# Patient Record
Sex: MALE | Race: WHITE | NOT HISPANIC OR LATINO | Employment: OTHER | URBAN - METROPOLITAN AREA
[De-identification: names, ages, dates, MRNs, and addresses within clinical notes are randomized per-mention and may not be internally consistent; named-entity substitution may affect disease eponyms.]

---

## 2024-10-01 ENCOUNTER — APPOINTMENT (OUTPATIENT)
Dept: CARDIOLOGY | Facility: HOSPITAL | Age: 74
End: 2024-10-01
Payer: MEDICARE

## 2024-10-01 ENCOUNTER — APPOINTMENT (OUTPATIENT)
Dept: RADIOLOGY | Facility: HOSPITAL | Age: 74
End: 2024-10-01
Payer: MEDICARE

## 2024-10-01 ENCOUNTER — HOSPITAL ENCOUNTER (INPATIENT)
Facility: HOSPITAL | Age: 74
LOS: 3 days | Discharge: HOME | End: 2024-10-04
Attending: STUDENT IN AN ORGANIZED HEALTH CARE EDUCATION/TRAINING PROGRAM | Admitting: INTERNAL MEDICINE
Payer: MEDICARE

## 2024-10-01 DIAGNOSIS — S72.115A CLOSED NONDISPLACED FRACTURE OF GREATER TROCHANTER OF LEFT FEMUR, INITIAL ENCOUNTER: Primary | ICD-10-CM

## 2024-10-01 PROBLEM — F41.9 ANXIETY DISORDER: Status: ACTIVE | Noted: 2024-10-01

## 2024-10-01 PROBLEM — H52.203 ASTIGMATISM OF BOTH EYES: Status: ACTIVE | Noted: 2024-01-16

## 2024-10-01 PROBLEM — E78.5 HYPERLIPIDEMIA: Status: ACTIVE | Noted: 2024-10-01

## 2024-10-01 PROBLEM — S72.112A CLOSED FRACTURE OF GREATER TROCHANTER OF LEFT FEMUR: Status: ACTIVE | Noted: 2024-10-01

## 2024-10-01 PROBLEM — H43.813 PVD (POSTERIOR VITREOUS DETACHMENT), BOTH EYES: Status: ACTIVE | Noted: 2024-01-16

## 2024-10-01 LAB
ABO GROUP (TYPE) IN BLOOD: NORMAL
ALBUMIN SERPL BCP-MCNC: 4.2 G/DL (ref 3.4–5)
ALP SERPL-CCNC: 54 U/L (ref 33–136)
ALT SERPL W P-5'-P-CCNC: 17 U/L (ref 10–52)
ANION GAP SERPL CALC-SCNC: 11 MMOL/L (ref 10–20)
ANTIBODY SCREEN: NORMAL
APTT PPP: 27 SECONDS (ref 27–38)
AST SERPL W P-5'-P-CCNC: 20 U/L (ref 9–39)
BASOPHILS # BLD AUTO: 0.07 X10*3/UL (ref 0–0.1)
BASOPHILS NFR BLD AUTO: 0.8 %
BILIRUB SERPL-MCNC: 1.1 MG/DL (ref 0–1.2)
BUN SERPL-MCNC: 10 MG/DL (ref 6–23)
CALCIUM SERPL-MCNC: 8.8 MG/DL (ref 8.6–10.3)
CHLORIDE SERPL-SCNC: 96 MMOL/L (ref 98–107)
CO2 SERPL-SCNC: 31 MMOL/L (ref 21–32)
CREAT SERPL-MCNC: 0.69 MG/DL (ref 0.5–1.3)
EGFRCR SERPLBLD CKD-EPI 2021: >90 ML/MIN/1.73M*2
EOSINOPHIL # BLD AUTO: 0 X10*3/UL (ref 0–0.4)
EOSINOPHIL NFR BLD AUTO: 0 %
ERYTHROCYTE [DISTWIDTH] IN BLOOD BY AUTOMATED COUNT: 12.6 % (ref 11.5–14.5)
GLUCOSE SERPL-MCNC: 116 MG/DL (ref 74–99)
HCT VFR BLD AUTO: 40.6 % (ref 41–52)
HGB BLD-MCNC: 13.3 G/DL (ref 13.5–17.5)
IMM GRANULOCYTES # BLD AUTO: 0.04 X10*3/UL (ref 0–0.5)
IMM GRANULOCYTES NFR BLD AUTO: 0.4 % (ref 0–0.9)
INR PPP: 1.1 (ref 0.9–1.1)
LYMPHOCYTES # BLD AUTO: 1.54 X10*3/UL (ref 0.8–3)
LYMPHOCYTES NFR BLD AUTO: 16.9 %
MCH RBC QN AUTO: 31.9 PG (ref 26–34)
MCHC RBC AUTO-ENTMCNC: 32.8 G/DL (ref 32–36)
MCV RBC AUTO: 97 FL (ref 80–100)
MONOCYTES # BLD AUTO: 1.08 X10*3/UL (ref 0.05–0.8)
MONOCYTES NFR BLD AUTO: 11.9 %
NEUTROPHILS # BLD AUTO: 6.38 X10*3/UL (ref 1.6–5.5)
NEUTROPHILS NFR BLD AUTO: 70 %
NRBC BLD-RTO: 0 /100 WBCS (ref 0–0)
PLATELET # BLD AUTO: 191 X10*3/UL (ref 150–450)
POTASSIUM SERPL-SCNC: 4 MMOL/L (ref 3.5–5.3)
PROT SERPL-MCNC: 6.9 G/DL (ref 6.4–8.2)
PROTHROMBIN TIME: 12.5 SECONDS (ref 9.8–12.8)
RBC # BLD AUTO: 4.17 X10*6/UL (ref 4.5–5.9)
RH FACTOR (ANTIGEN D): NORMAL
SODIUM SERPL-SCNC: 134 MMOL/L (ref 136–145)
WBC # BLD AUTO: 9.1 X10*3/UL (ref 4.4–11.3)

## 2024-10-01 PROCEDURE — 73502 X-RAY EXAM HIP UNI 2-3 VIEWS: CPT | Mod: LEFT SIDE | Performed by: RADIOLOGY

## 2024-10-01 PROCEDURE — 2500000004 HC RX 250 GENERAL PHARMACY W/ HCPCS (ALT 636 FOR OP/ED): Performed by: PHYSICIAN ASSISTANT

## 2024-10-01 PROCEDURE — 86900 BLOOD TYPING SEROLOGIC ABO: CPT | Performed by: PHYSICIAN ASSISTANT

## 2024-10-01 PROCEDURE — 99223 1ST HOSP IP/OBS HIGH 75: CPT | Performed by: PHYSICIAN ASSISTANT

## 2024-10-01 PROCEDURE — 2500000001 HC RX 250 WO HCPCS SELF ADMINISTERED DRUGS (ALT 637 FOR MEDICARE OP): Performed by: PHYSICIAN ASSISTANT

## 2024-10-01 PROCEDURE — 85730 THROMBOPLASTIN TIME PARTIAL: CPT | Performed by: PHYSICIAN ASSISTANT

## 2024-10-01 PROCEDURE — 1210000001 HC SEMI-PRIVATE ROOM DAILY

## 2024-10-01 PROCEDURE — 93005 ELECTROCARDIOGRAM TRACING: CPT

## 2024-10-01 PROCEDURE — 73700 CT LOWER EXTREMITY W/O DYE: CPT | Mod: FOREIGN READ | Performed by: RADIOLOGY

## 2024-10-01 PROCEDURE — 36415 COLL VENOUS BLD VENIPUNCTURE: CPT | Performed by: PHYSICIAN ASSISTANT

## 2024-10-01 PROCEDURE — 72192 CT PELVIS W/O DYE: CPT

## 2024-10-01 PROCEDURE — 99222 1ST HOSP IP/OBS MODERATE 55: CPT | Performed by: ORTHOPAEDIC SURGERY

## 2024-10-01 PROCEDURE — 73700 CT LOWER EXTREMITY W/O DYE: CPT | Mod: LT

## 2024-10-01 PROCEDURE — 2500000002 HC RX 250 W HCPCS SELF ADMINISTERED DRUGS (ALT 637 FOR MEDICARE OP, ALT 636 FOR OP/ED): Performed by: PHYSICIAN ASSISTANT

## 2024-10-01 PROCEDURE — 73502 X-RAY EXAM HIP UNI 2-3 VIEWS: CPT | Mod: LT

## 2024-10-01 PROCEDURE — 71045 X-RAY EXAM CHEST 1 VIEW: CPT

## 2024-10-01 PROCEDURE — 72192 CT PELVIS W/O DYE: CPT | Mod: FOREIGN READ | Performed by: RADIOLOGY

## 2024-10-01 PROCEDURE — 85025 COMPLETE CBC W/AUTO DIFF WBC: CPT | Performed by: PHYSICIAN ASSISTANT

## 2024-10-01 PROCEDURE — 85610 PROTHROMBIN TIME: CPT | Performed by: PHYSICIAN ASSISTANT

## 2024-10-01 PROCEDURE — 86901 BLOOD TYPING SEROLOGIC RH(D): CPT | Performed by: PHYSICIAN ASSISTANT

## 2024-10-01 PROCEDURE — 80053 COMPREHEN METABOLIC PANEL: CPT | Performed by: PHYSICIAN ASSISTANT

## 2024-10-01 PROCEDURE — 99285 EMERGENCY DEPT VISIT HI MDM: CPT

## 2024-10-01 RX ORDER — ATORVASTATIN CALCIUM 10 MG/1
1 TABLET, FILM COATED ORAL NIGHTLY
COMMUNITY
Start: 2024-10-06

## 2024-10-01 RX ORDER — SENNOSIDES 8.6 MG/1
2 TABLET ORAL 2 TIMES DAILY
Status: DISCONTINUED | OUTPATIENT
Start: 2024-10-01 | End: 2024-10-03

## 2024-10-01 RX ORDER — FINASTERIDE 5 MG/1
5 TABLET, FILM COATED ORAL DAILY
COMMUNITY
End: 2024-10-01 | Stop reason: ENTERED-IN-ERROR

## 2024-10-01 RX ORDER — ACETAMINOPHEN 650 MG/1
650 SUPPOSITORY RECTAL EVERY 4 HOURS PRN
Status: DISCONTINUED | OUTPATIENT
Start: 2024-10-01 | End: 2024-10-03

## 2024-10-01 RX ORDER — TAMSULOSIN HYDROCHLORIDE 0.4 MG/1
1 CAPSULE ORAL NIGHTLY
COMMUNITY
Start: 2024-10-06

## 2024-10-01 RX ORDER — ESCITALOPRAM OXALATE 10 MG/1
10 TABLET ORAL DAILY
COMMUNITY
Start: 2012-08-15 | End: 2024-10-01 | Stop reason: ENTERED-IN-ERROR

## 2024-10-01 RX ORDER — OXYCODONE HYDROCHLORIDE 5 MG/1
5 TABLET ORAL EVERY 4 HOURS PRN
Status: DISCONTINUED | OUTPATIENT
Start: 2024-10-01 | End: 2024-10-03

## 2024-10-01 RX ORDER — ONDANSETRON 4 MG/1
4 TABLET, ORALLY DISINTEGRATING ORAL EVERY 8 HOURS PRN
Status: DISCONTINUED | OUTPATIENT
Start: 2024-10-01 | End: 2024-10-03

## 2024-10-01 RX ORDER — ACETAMINOPHEN 160 MG/5ML
650 SOLUTION ORAL EVERY 4 HOURS PRN
Status: DISCONTINUED | OUTPATIENT
Start: 2024-10-01 | End: 2024-10-03

## 2024-10-01 RX ORDER — MORPHINE SULFATE 2 MG/ML
2 INJECTION, SOLUTION INTRAMUSCULAR; INTRAVENOUS ONCE
Status: COMPLETED | OUTPATIENT
Start: 2024-10-01 | End: 2024-10-01

## 2024-10-01 RX ORDER — ATORVASTATIN CALCIUM 10 MG/1
10 TABLET, FILM COATED ORAL DAILY
Status: DISCONTINUED | OUTPATIENT
Start: 2024-10-02 | End: 2024-10-04 | Stop reason: HOSPADM

## 2024-10-01 RX ORDER — ENOXAPARIN SODIUM 100 MG/ML
40 INJECTION SUBCUTANEOUS EVERY 24 HOURS
Status: DISCONTINUED | OUTPATIENT
Start: 2024-10-02 | End: 2024-10-03

## 2024-10-01 RX ORDER — ONDANSETRON HYDROCHLORIDE 2 MG/ML
4 INJECTION, SOLUTION INTRAVENOUS EVERY 8 HOURS PRN
Status: DISCONTINUED | OUTPATIENT
Start: 2024-10-01 | End: 2024-10-03

## 2024-10-01 RX ORDER — ESCITALOPRAM OXALATE 5 MG/1
1 TABLET ORAL DAILY
COMMUNITY
Start: 2024-10-06

## 2024-10-01 RX ORDER — HYDROCODONE BITARTRATE AND ACETAMINOPHEN 5; 325 MG/1; MG/1
1 TABLET ORAL ONCE
Status: COMPLETED | OUTPATIENT
Start: 2024-10-01 | End: 2024-10-01

## 2024-10-01 RX ORDER — ATORVASTATIN CALCIUM 40 MG/1
40 TABLET, FILM COATED ORAL NIGHTLY
COMMUNITY
End: 2024-10-01 | Stop reason: ENTERED-IN-ERROR

## 2024-10-01 RX ORDER — TAMSULOSIN HYDROCHLORIDE 0.4 MG/1
0.4 CAPSULE ORAL DAILY
COMMUNITY
Start: 2013-04-16 | End: 2024-10-01 | Stop reason: ENTERED-IN-ERROR

## 2024-10-01 RX ORDER — TAMSULOSIN HYDROCHLORIDE 0.4 MG/1
0.4 CAPSULE ORAL NIGHTLY
Status: DISCONTINUED | OUTPATIENT
Start: 2024-10-01 | End: 2024-10-04 | Stop reason: HOSPADM

## 2024-10-01 RX ORDER — ESCITALOPRAM OXALATE 10 MG/1
5 TABLET ORAL DAILY
Status: DISCONTINUED | OUTPATIENT
Start: 2024-10-02 | End: 2024-10-04 | Stop reason: HOSPADM

## 2024-10-01 RX ORDER — ACETAMINOPHEN 325 MG/1
650 TABLET ORAL EVERY 4 HOURS PRN
Status: DISCONTINUED | OUTPATIENT
Start: 2024-10-01 | End: 2024-10-03

## 2024-10-01 RX ORDER — TRAMADOL HYDROCHLORIDE 50 MG/1
50 TABLET ORAL EVERY 8 HOURS PRN
Status: DISCONTINUED | OUTPATIENT
Start: 2024-10-01 | End: 2024-10-03

## 2024-10-01 SDOH — SOCIAL STABILITY: SOCIAL INSECURITY: ARE THERE ANY APPARENT SIGNS OF INJURIES/BEHAVIORS THAT COULD BE RELATED TO ABUSE/NEGLECT?: NO

## 2024-10-01 SDOH — ECONOMIC STABILITY: INCOME INSECURITY: HOW HARD IS IT FOR YOU TO PAY FOR THE VERY BASICS LIKE FOOD, HOUSING, MEDICAL CARE, AND HEATING?: NOT HARD AT ALL

## 2024-10-01 SDOH — SOCIAL STABILITY: SOCIAL INSECURITY: WITHIN THE LAST YEAR, HAVE YOU BEEN HUMILIATED OR EMOTIONALLY ABUSED IN OTHER WAYS BY YOUR PARTNER OR EX-PARTNER?: NO

## 2024-10-01 SDOH — ECONOMIC STABILITY: INCOME INSECURITY: IN THE PAST 12 MONTHS, HAS THE ELECTRIC, GAS, OIL, OR WATER COMPANY THREATENED TO SHUT OFF SERVICE IN YOUR HOME?: NO

## 2024-10-01 SDOH — SOCIAL STABILITY: SOCIAL INSECURITY: DOES ANYONE TRY TO KEEP YOU FROM HAVING/CONTACTING OTHER FRIENDS OR DOING THINGS OUTSIDE YOUR HOME?: NO

## 2024-10-01 SDOH — SOCIAL STABILITY: SOCIAL INSECURITY: ARE YOU OR HAVE YOU BEEN THREATENED OR ABUSED PHYSICALLY, EMOTIONALLY, OR SEXUALLY BY ANYONE?: NO

## 2024-10-01 SDOH — SOCIAL STABILITY: SOCIAL INSECURITY: DO YOU FEEL ANYONE HAS EXPLOITED OR TAKEN ADVANTAGE OF YOU FINANCIALLY OR OF YOUR PERSONAL PROPERTY?: NO

## 2024-10-01 SDOH — SOCIAL STABILITY: SOCIAL INSECURITY: DO YOU FEEL UNSAFE GOING BACK TO THE PLACE WHERE YOU ARE LIVING?: NO

## 2024-10-01 SDOH — SOCIAL STABILITY: SOCIAL INSECURITY: HAVE YOU HAD ANY THOUGHTS OF HARMING ANYONE ELSE?: NO

## 2024-10-01 SDOH — ECONOMIC STABILITY: FOOD INSECURITY: WITHIN THE PAST 12 MONTHS, THE FOOD YOU BOUGHT JUST DIDN'T LAST AND YOU DIDN'T HAVE MONEY TO GET MORE.: NEVER TRUE

## 2024-10-01 SDOH — SOCIAL STABILITY: SOCIAL INSECURITY: HAVE YOU HAD THOUGHTS OF HARMING ANYONE ELSE?: NO

## 2024-10-01 SDOH — SOCIAL STABILITY: SOCIAL INSECURITY
WITHIN THE LAST YEAR, HAVE TO BEEN RAPED OR FORCED TO HAVE ANY KIND OF SEXUAL ACTIVITY BY YOUR PARTNER OR EX-PARTNER?: NO

## 2024-10-01 SDOH — SOCIAL STABILITY: SOCIAL INSECURITY
WITHIN THE LAST YEAR, HAVE YOU BEEN KICKED, HIT, SLAPPED, OR OTHERWISE PHYSICALLY HURT BY YOUR PARTNER OR EX-PARTNER?: NO

## 2024-10-01 SDOH — HEALTH STABILITY: PHYSICAL HEALTH: ON AVERAGE, HOW MANY MINUTES DO YOU ENGAGE IN EXERCISE AT THIS LEVEL?: 30 MIN

## 2024-10-01 SDOH — SOCIAL STABILITY: SOCIAL INSECURITY: WITHIN THE LAST YEAR, HAVE YOU BEEN AFRAID OF YOUR PARTNER OR EX-PARTNER?: NO

## 2024-10-01 SDOH — SOCIAL STABILITY: SOCIAL INSECURITY: ABUSE: ADULT

## 2024-10-01 SDOH — ECONOMIC STABILITY: FOOD INSECURITY: WITHIN THE PAST 12 MONTHS, YOU WORRIED THAT YOUR FOOD WOULD RUN OUT BEFORE YOU GOT MONEY TO BUY MORE.: NEVER TRUE

## 2024-10-01 SDOH — ECONOMIC STABILITY: TRANSPORTATION INSECURITY
IN THE PAST 12 MONTHS, HAS LACK OF TRANSPORTATION KEPT YOU FROM MEETINGS, WORK, OR FROM GETTING THINGS NEEDED FOR DAILY LIVING?: NO

## 2024-10-01 SDOH — SOCIAL STABILITY: SOCIAL INSECURITY: WERE YOU ABLE TO COMPLETE ALL THE BEHAVIORAL HEALTH SCREENINGS?: YES

## 2024-10-01 SDOH — ECONOMIC STABILITY: INCOME INSECURITY: IN THE LAST 12 MONTHS, WAS THERE A TIME WHEN YOU WERE NOT ABLE TO PAY THE MORTGAGE OR RENT ON TIME?: NO

## 2024-10-01 SDOH — SOCIAL STABILITY: SOCIAL INSECURITY: HAS ANYONE EVER THREATENED TO HURT YOUR FAMILY OR YOUR PETS?: NO

## 2024-10-01 SDOH — ECONOMIC STABILITY: TRANSPORTATION INSECURITY
IN THE PAST 12 MONTHS, HAS THE LACK OF TRANSPORTATION KEPT YOU FROM MEDICAL APPOINTMENTS OR FROM GETTING MEDICATIONS?: NO

## 2024-10-01 SDOH — HEALTH STABILITY: PHYSICAL HEALTH: ON AVERAGE, HOW MANY DAYS PER WEEK DO YOU ENGAGE IN MODERATE TO STRENUOUS EXERCISE (LIKE A BRISK WALK)?: 3 DAYS

## 2024-10-01 ASSESSMENT — COGNITIVE AND FUNCTIONAL STATUS - GENERAL
DAILY ACTIVITIY SCORE: 24
DAILY ACTIVITIY SCORE: 24
MOBILITY SCORE: 24
MOBILITY SCORE: 24
PATIENT BASELINE BEDBOUND: NO

## 2024-10-01 ASSESSMENT — ACTIVITIES OF DAILY LIVING (ADL)
JUDGMENT_ADEQUATE_SAFELY_COMPLETE_DAILY_ACTIVITIES: YES
WALKS IN HOME: INDEPENDENT
EFFECT OF PAIN ON DAILY ACTIVITIES: DECREASED
HEARING - RIGHT EAR: FUNCTIONAL
FEEDING YOURSELF: INDEPENDENT
GROOMING: INDEPENDENT
TOILETING: INDEPENDENT
HEARING - LEFT EAR: FUNCTIONAL
DRESSING YOURSELF: INDEPENDENT
BATHING: INDEPENDENT
ADEQUATE_TO_COMPLETE_ADL: YES
PATIENT'S MEMORY ADEQUATE TO SAFELY COMPLETE DAILY ACTIVITIES?: YES

## 2024-10-01 ASSESSMENT — PAIN DESCRIPTION - ORIENTATION
ORIENTATION: LEFT
ORIENTATION: LEFT

## 2024-10-01 ASSESSMENT — COLUMBIA-SUICIDE SEVERITY RATING SCALE - C-SSRS
6. HAVE YOU EVER DONE ANYTHING, STARTED TO DO ANYTHING, OR PREPARED TO DO ANYTHING TO END YOUR LIFE?: NO
2. HAVE YOU ACTUALLY HAD ANY THOUGHTS OF KILLING YOURSELF?: NO
1. IN THE PAST MONTH, HAVE YOU WISHED YOU WERE DEAD OR WISHED YOU COULD GO TO SLEEP AND NOT WAKE UP?: NO

## 2024-10-01 ASSESSMENT — PATIENT HEALTH QUESTIONNAIRE - PHQ9
1. LITTLE INTEREST OR PLEASURE IN DOING THINGS: NOT AT ALL
1. LITTLE INTEREST OR PLEASURE IN DOING THINGS: NOT AT ALL
2. FEELING DOWN, DEPRESSED OR HOPELESS: NOT AT ALL
2. FEELING DOWN, DEPRESSED OR HOPELESS: NOT AT ALL
SUM OF ALL RESPONSES TO PHQ9 QUESTIONS 1 & 2: 0
SUM OF ALL RESPONSES TO PHQ9 QUESTIONS 1 & 2: 0

## 2024-10-01 ASSESSMENT — LIFESTYLE VARIABLES
AUDIT-C TOTAL SCORE: 0
SKIP TO QUESTIONS 9-10: 1
SKIP TO QUESTIONS 9-10: 1
HOW MANY STANDARD DRINKS CONTAINING ALCOHOL DO YOU HAVE ON A TYPICAL DAY: PATIENT DOES NOT DRINK
PRESCIPTION_ABUSE_PAST_12_MONTHS: NO
SUBSTANCE_ABUSE_PAST_12_MONTHS: NO
HOW OFTEN DO YOU HAVE 6 OR MORE DRINKS ON ONE OCCASION: NEVER
HOW OFTEN DO YOU HAVE A DRINK CONTAINING ALCOHOL: NEVER
AUDIT-C TOTAL SCORE: 0
AUDIT-C TOTAL SCORE: 0
HOW MANY STANDARD DRINKS CONTAINING ALCOHOL DO YOU HAVE ON A TYPICAL DAY: PATIENT DOES NOT DRINK
HOW OFTEN DO YOU HAVE 6 OR MORE DRINKS ON ONE OCCASION: NEVER
HOW OFTEN DO YOU HAVE A DRINK CONTAINING ALCOHOL: NEVER
AUDIT-C TOTAL SCORE: 0

## 2024-10-01 ASSESSMENT — PAIN DESCRIPTION - DESCRIPTORS
DESCRIPTORS: ACHING
DESCRIPTORS: ACHING;TENDER;SHARP
DESCRIPTORS: ACHING;TENDER

## 2024-10-01 ASSESSMENT — PAIN SCALES - GENERAL
PAINLEVEL_OUTOF10: 5 - MODERATE PAIN
PAINLEVEL_OUTOF10: 9
PAINLEVEL_OUTOF10: 3
PAINLEVEL_OUTOF10: 2
PAINLEVEL_OUTOF10: 9

## 2024-10-01 ASSESSMENT — PAIN DESCRIPTION - LOCATION
LOCATION: HIP
LOCATION: HIP

## 2024-10-01 ASSESSMENT — PAIN DESCRIPTION - ONSET
ONSET: SUDDEN
ONSET: SUDDEN

## 2024-10-01 ASSESSMENT — PAIN - FUNCTIONAL ASSESSMENT
PAIN_FUNCTIONAL_ASSESSMENT: 0-10

## 2024-10-01 ASSESSMENT — PAIN DESCRIPTION - PAIN TYPE
TYPE: ACUTE PAIN
TYPE: ACUTE PAIN

## 2024-10-01 ASSESSMENT — PAIN DESCRIPTION - FREQUENCY
FREQUENCY: INTERMITTENT
FREQUENCY: INTERMITTENT

## 2024-10-01 ASSESSMENT — PAIN DESCRIPTION - PROGRESSION
CLINICAL_PROGRESSION: GRADUALLY WORSENING
CLINICAL_PROGRESSION: NOT CHANGED

## 2024-10-01 NOTE — ED TRIAGE NOTES
Patient presents to ED from home for fall a slip and fall on wet tile in the bathroom on Sunday. Patient complains if left hip pain. Denies CP, SOB, or dizziness.

## 2024-10-01 NOTE — PROGRESS NOTES
Pharmacy Medication History Review   Spoke to the patient, changed the dose of a couple meds    James Viera is a 74 y.o. male admitted for Closed fracture of greater trochanter of left femur. Pharmacy reviewed the patient's [id number] medications and allergies for accuracy.    The list below reflectives the updated PTA list. Please review each medication in order reconciliation for additional clarification and justification.   The following updates were made to the Prior to Admission medication list:     Source of Information:     Medications ADDED:     Medications CHANGED:  Lipitor is 10 mg  Lexapro is 5 mg  Medications REMOVED:   Proscar  Medications NOT TAKING:       Allergy reviewed : Yes    Comments:     Prior to Admission Medications   Prescriptions Last Dose Informant   atorvastatin (Lipitor) 10 mg tablet 9/30/2024 at pm    Sig: Take 1 tablet (10 mg) by mouth once daily at bedtime.   escitalopram (Lexapro) 5 mg tablet 10/1/2024    Sig: Take 1 tablet (5 mg) by mouth once daily.   tamsulosin (Flomax) 0.4 mg 24 hr capsule 9/30/2024 at pm    Sig: Take 1 capsule (0.4 mg) by mouth once daily at bedtime.      Facility-Administered Medications: None       The list below reflectives the updated allergy list. Please review each documented allergy for additional clarification and justification.  Allergies  Reviewed by Eloina aCno on 10/1/2024        Severity Reactions Comments    Sulfa (sulfonamide Antibiotics) Low Rash             Below are additional concerns with the patient's PTA list.      Eloina Cano

## 2024-10-01 NOTE — H&P
History Of Present Illness  James Viera is a 74 y.o. male presenting with left femur fracture.  Comminuted left greater trochanter and left superolateral femoral neck fractures.  Patient has a history of hyperlipidemia and anxiety and BPH.  He had a mechanical fall 2 days ago.  He was out of town and slipped on a wet tile bathroom floor landing on his left hip.  He was able to get up and he has been able to walk but he has significant pain with weightbearing and moving and walking.  Developed slight nasal congestion and cough yesterday negative COVID test yesterday.  No other symptoms, no pain at rest.    Past medical history: Hyperlipidemia, anxiety, BPH    Medications: Atorvastatin 10 mg, Lexapro 5 mg, Flomax 0.4 mg    Allergies: Sulfa    Social history: Drinks alcohol occasionally, denies tobacco use    ED course:.  X-rays of the left hip with pelvis show the left femoral neck fracture.  CT of the hip shows a acute comminuted fracture of the left greater trochanter and left superolateral femoral neck as well as degenerative changes.  Orthopedics was consulted and they are likely to take the patient to the OR tomorrow per Dr. Dykes.  Patient is admitted to medicine service.     Past Medical History:   Diagnosis Date    Cough, unspecified 04/16/2013    Cough    Encounter for immunization 10/21/2014    Need for prophylactic vaccination and inoculation against influenza    Other conditions influencing health status 08/20/2013    Attention-deficit Hyperactivity Disorder    Personal history of other mental and behavioral disorders 06/17/2013    History of anxiety disorder     Past Surgical History:   Procedure Laterality Date    TONSILLECTOMY  05/11/2015    Tonsillectomy           Family History  No family history on file.     Allergies  Sulfa (sulfonamide antibiotics)    Review of Systems  Patient denies chest pain, shortness of breath, nausea, vomiting, fever, chills, diarrhea, constipation,  vision changes,  rashes, dysuria, paresthesias, vertigo, headache,  or any other complaints at this time. A complete review of systems was done, and is as stated in the history of present illness, is otherwise negative or not pertinent to the complaint.    Physical Exam  Physical exam: Vital signs and nurses notes were reviewed.    General:  no acute distress. Alert and oriented  x 3.     Head: atraumatic and normocephalic    Eyes: Pupils equal round reactive to light, EOMs are intact, conjunctivae is not injected.    Oropharynx: , buccal mucosa is moist.    Ears:  normal external exam, no swelling or erythema,    Nasal: normal external exam,     Neck: Supple, full range of motion,     Cardiac: Regular rate and rhythm. No murmurs noted.     Pulmonary: Lungs clear bilaterally with good aeration. No adventitious breath sounds. No wheezes rales or rhonchi. No accessory muscle use no retraction noted.    Abdomen: Soft,  Nontender. No guarding, rigidity, or distention. Normoactive bowel sounds. No pulsatile masses, no bruits.     Extremities: Examination of the left hip revealed no significant ecchymosis or erythema or soft tissue swelling.  He is very mild tenderness to palpation to the lateral left hip.  Does have decreased range of motion secondary to increased pain with moving the left lower extremity.  Other extremities have full range of motion.  There is no pitting edema.  Strong dorsal and plantarflexion of the feet against resistance bilaterally.  The leg does not appear shortened or rotated.    Skin: No rash seen. Skin is warm and dry     Neuro: Patient is alert and oriented x3. Speech is clear. There is no asymmetry with facial grimaces, and no tongue deviation. Patient moves all extremities independently. Sensation is intact. No obvious neuro deficits are noted.     Last Recorded Vitals  /80 (BP Location: Left arm, Patient Position: Sitting)   Pulse 76   Temp 37.2 °C (99 °F) (Temporal)   Resp 18   Wt 68 kg (150 lb)    SpO2 95%     Relevant Results  Scheduled medications  [START ON 10/2/2024] atorvastatin, 10 mg, oral, Daily  [START ON 10/2/2024] enoxaparin, 40 mg, subcutaneous, q24h  [START ON 10/2/2024] escitalopram, 5 mg, oral, Daily  sennosides, 2 tablet, oral, BID  tamsulosin, 0.4 mg, oral, Nightly      Continuous medications     PRN medications  PRN medications: acetaminophen **OR** acetaminophen **OR** acetaminophen, ondansetron ODT **OR** ondansetron, oxyCODONE, traMADol    No results found for this or any previous visit (from the past 24 hour(s)).  CT hip left wo IV contrast   Final Result   Acute comminuted fracture of the left greater trochanter and left   superolateral femoral neck.   Degenerative change of both hip joints and lower lumbar spine.   Signed by Ray Prescott MD      CT pelvis wo IV contrast   Final Result   Acute comminuted fracture of the left greater trochanter and left   superolateral femoral neck.   Degenerative change of both hip joints and lower lumbar spine.   Signed by Ray Prescott MD      XR hip left with pelvis when performed 2 or 3 views    (Results Pending)          Assessment/Plan   Assessment & Plan  Closed fracture of greater trochanter of left femur    Closed nondisplaced fracture of greater trochanter of left femur, initial encounter      Plan: Orthopedic consult in process  N.p.o. at midnight in anticipation of surgery tomorrow  Regular diet currently  Lovenox after surgery  SCDs  Continue the home meds including atorvastatin, Lexapro, Flomax  Tylenol, Zofran as needed  Senokot daily  Tramadol and oxycodone as needed pain  EKG pending  INR, CBC, CMP, type and screen pending  Daily CBC and BMP ordered  PT, OT, social work after surgery  Findings, orders, plan discussed with Dr. Cassia Caballero PA-C

## 2024-10-01 NOTE — ED NOTES
Pt transported to [facility] by [facility] on ED stretcher with safety/fall precautions, mask on, belongings, paperwork. No significant detrimental changes prior to transfer. Neuro/skin/respiratory grossly WDL.      Ag Curtis RN  10/01/24 1471

## 2024-10-01 NOTE — CONSULTS
Orthopaedic Surgery Consult H&P    HPI:   Orthopaedic Problems/Injuries: Left GT fx w Intertrochanteric extension  Other Injuries: None    74 y.o. male PMH BPH, HLD presents after slipping on wet tile in his bathroom on Sunday 9/29 sustaining above. Denies numbness, tingling, and open wounds on the affected limb.   No other areas of extremity injury.  Pain persisted in his left hip, deep seated and sharp and so he came into ED today.  No LOC.      PMH: per above/EMR  PSH: per above/EMR  SocHx:      -  Denies tobacco use      -  Denies EtOH use      -  Denies other drug use  FamHx:  Non-contributory to this patient's acute orthopaedic problem.   Allergies: Sulfa  Meds: atorvastatin, lexapro, flomax    ROS      - 14 point ROS negative except as above    Physical Exam:  Gen: AOx3, NAD  HEENT: normocephalic atraumatic  Psych: appropriate mood and affect  Resp: nonlabored breathing  Cardiac: Extremities WWP, RRR to peripheral palpation  Neuro: CN 2-12 grossly intact  Skin: no rashes, bruising left arm     Left lower extremity:  - Skin intact   - Tender to palpation over left hip  - Fires EHL/DF/PF.  - Sensation intact to light touch in sural, saphenous, superficial/deep peroneal, tibial nerve distributions.  - 2+ DP pulse, < 2 seconds capillary refill.    A full secondary exam was performed and all relevant findings discussed and noted above.    Results for orders placed or performed during the hospital encounter of 10/01/24 (from the past 24 hour(s))   CBC and Auto Differential   Result Value Ref Range    WBC 9.1 4.4 - 11.3 x10*3/uL    nRBC 0.0 0.0 - 0.0 /100 WBCs    RBC 4.17 (L) 4.50 - 5.90 x10*6/uL    Hemoglobin 13.3 (L) 13.5 - 17.5 g/dL    Hematocrit 40.6 (L) 41.0 - 52.0 %    MCV 97 80 - 100 fL    MCH 31.9 26.0 - 34.0 pg    MCHC 32.8 32.0 - 36.0 g/dL    RDW 12.6 11.5 - 14.5 %    Platelets 191 150 - 450 x10*3/uL    Neutrophils % 70.0 40.0 - 80.0 %    Immature Granulocytes %, Automated 0.4 0.0 - 0.9 %    Lymphocytes %  16.9 13.0 - 44.0 %    Monocytes % 11.9 2.0 - 10.0 %    Eosinophils % 0.0 0.0 - 6.0 %    Basophils % 0.8 0.0 - 2.0 %    Neutrophils Absolute 6.38 (H) 1.60 - 5.50 x10*3/uL    Immature Granulocytes Absolute, Automated 0.04 0.00 - 0.50 x10*3/uL    Lymphocytes Absolute 1.54 0.80 - 3.00 x10*3/uL    Monocytes Absolute 1.08 (H) 0.05 - 0.80 x10*3/uL    Eosinophils Absolute 0.00 0.00 - 0.40 x10*3/uL    Basophils Absolute 0.07 0.00 - 0.10 x10*3/uL   Comprehensive metabolic panel   Result Value Ref Range    Glucose 116 (H) 74 - 99 mg/dL    Sodium 134 (L) 136 - 145 mmol/L    Potassium 4.0 3.5 - 5.3 mmol/L    Chloride 96 (L) 98 - 107 mmol/L    Bicarbonate 31 21 - 32 mmol/L    Anion Gap 11 10 - 20 mmol/L    Urea Nitrogen 10 6 - 23 mg/dL    Creatinine 0.69 0.50 - 1.30 mg/dL    eGFR >90 >60 mL/min/1.73m*2    Calcium 8.8 8.6 - 10.3 mg/dL    Albumin 4.2 3.4 - 5.0 g/dL    Alkaline Phosphatase 54 33 - 136 U/L    Total Protein 6.9 6.4 - 8.2 g/dL    AST 20 9 - 39 U/L    Bilirubin, Total 1.1 0.0 - 1.2 mg/dL    ALT 17 10 - 52 U/L   Protime-INR   Result Value Ref Range    Protime 12.5 9.8 - 12.8 seconds    INR 1.1 0.9 - 1.1   aPTT   Result Value Ref Range    aPTT 27 27 - 38 seconds       XR chest 1 view   Final Result   Allowing for the aforementioned limitation, unremarkable chest.        Signed by: Kunal Yip 10/1/2024 5:05 PM   Dictation workstation:   [id number]      XR hip left with pelvis when performed 2 or 3 views   Final Result   The left femoral neck fracture described on the CT scan of earlier the   same day is much less conspicuous by plain radiography.   Signed by Matthew Arriola MD      CT hip left wo IV contrast   Final Result   Acute comminuted fracture of the left greater trochanter and left   superolateral femoral neck.   Degenerative change of both hip joints and lower lumbar spine.   Signed by Ray Prescott MD      CT pelvis wo IV contrast   Final Result   Acute comminuted fracture of the left greater trochanter and  left   superolateral femoral neck.   Degenerative change of both hip joints and lower lumbar spine.   Signed by Ray Prescott MD          Assessment:  Orthopaedic Problems/Injuries: Left GT fx w Intertrochanteric extension  Other Injuries: None    74 y.o. male PMH BPH, HLD presents after mGLF on Sunday 9/29 sustaining above. Isolated injury. Reports that he is very healthy and active at baseline.     Plan:  - Consented and posted for Left hip cephalomedullary nail with Dr Dykes on 10/2/24  - WB: NWB LLE   - Abx: no indication for preoperative abx  - Diet: NPO at midnight  - DVT: per primary, hold for OR  - Please obtain all preop labs (CBC,BMP,EKG, CXR, Coags, Type and Screen)  - Purdy: Recommend purdy in setting of immobilizing fx if patient is amenable    - Dispo: Pending OR tomorrow    Wilner Newberry MD  On Call Resident - PGY-3 Orthopedic Surgery  [facility]  Epic Message Preferred  Pager: [phone]    This patient was seen within 30 minutes of initial consult.    I saw and evaluated the patient.   Briefly this is a pleasant 74 year old male who fell on wet floor in bathroom and sustained an intertrochanteric equivalent fracture left hip.  I personally reviewed the patient's xray and ct images and reports showing greater trochanteric fracture with intertrochanteric extension and small area involvement superior neck  on left.  Discussed alternatives of treatment in detail, risks and benefits.  Gamma nail fixation of fracture recommended for best functional outcome and to aid healing of fracture.  Discussed risks of surgery including pain, bleeding, infection, bone healing problems, wound healing problems, medical issues, DVT and PE.  Discussed post-op course requiring foot flat weightbearing, rehab protocol, recovery time (early 3 months, full 6-12 months).  Discussed procedure details and answered patient's questions.  Determination if he goes home or to SNF after surgery will depend on how he  does with PT after surgery and home situation/layout.  I personally obtained the key and critical portions of the history and physical exam or was physically present for key and critical portions performed by the resident/fellow. I reviewed the resident/fellow's documentation and discussed the patient with the resident/fellow. I agree with the resident/fellow's medical decision making as documented in the note.    Larissa Dykes MD   _________________________________________________________    This patient will be followed by the Ortho Foot and Ankle Team while inpatient. See team members and contacts below:     While admitted, this patient will be followed by the Ortho Foot and Ankle Team.  Please contact below resident with any questions (Available via Epic Chat).    Miguelito Cuevas, PGY-3     6pm-6am M-F, Holidays, and weekends page Ortho on-call @[phone] with urgent questions/concerns.

## 2024-10-01 NOTE — ED NOTES
Report given to nurse [facility]. Transport to follow shortly.     Ag Curtis RN  10/01/24 6183

## 2024-10-01 NOTE — ED PROVIDER NOTES
HPI   Chief Complaint   Patient presents with    Fall    Hip Pain       History of present illness: 74-year-old male complains of fall and left hip injury that occurred 2 days ago.  Patient states that he has 9 out of 10 pain that is persistent and worse with ambulating.  He saw his primary care provider yesterday who ordered an x-ray.  This x-ray showed no fracture.  He was concerned because he is having persistent pain and is causing him difficulty in ambulating.  He took some Tylenol today with limited improvement of pain.  Denies says that he did not receive a prescription for pain medicine.  Denies paresthesias weakness or loss of function.  Denies abdominal pain back pain head injury loss of conscious neck pain neck stiffness chest pain or back pain.    Review of systems: Constitutional, eye, ENT, cardiovascular, respiratory, gastrointestinal, genitourinary, neurologic, musculoskeletal, dermatologic, hematologic, endocrine systems were evaluated and were negative unless otherwise specified in history of present illness.    Medications: Reviewed and per nursing note.    Family history: Denies relevant medical conditions.    Social history: Denies tobacco, alcohol, drug use.      Physical exam:    Appearance: Well-developed, well-nourished, nontoxic-appearing, alert and oriented x3. Talking in complete sentences.    HEENT:  Head normocephalic atraumatic,    NECK:  Nml Inspection    Respiratory: Clear to auscultation    Cardiovascular: Regular rate and rhythm. Capillary refill less than 3 seconds on all extremities.    Neuro:  Oriented x 3, Speech Clear, cranial nerves grossly intact. Normal sensation to light touch in all 4 extremities. Deep tendon reflexes 2+ symmetrically in upper and lower extremities.    Musculoskeletal: No focal tenderness in the left hip.  Painful flexion and abduction of the left hip.  No vertebral point tenderness.  Patient spontaneously moves all 4 extremities with 5/5 muscle  strength.    Skin:  No cyanosis, clubbing, edema, open wounds, or rashes.              Patient History   Past Medical History:   Diagnosis Date    Cough, unspecified 04/16/2013    Cough    Encounter for immunization 10/21/2014    Need for prophylactic vaccination and inoculation against influenza    Other conditions influencing health status 08/20/2013    Attention-deficit Hyperactivity Disorder    Personal history of other mental and behavioral disorders 06/17/2013    History of anxiety disorder     Past Surgical History:   Procedure Laterality Date    TONSILLECTOMY  05/11/2015    Tonsillectomy     No family history on file.  Social History     Tobacco Use    Smoking status: Never     Passive exposure: Never    Smokeless tobacco: Never   Vaping Use    Vaping status: Never Used   Substance Use Topics    Alcohol use: Never    Drug use: Never       Physical Exam   ED Triage Vitals [10/01/24 1143]   Temperature Heart Rate Respirations BP   37.2 °C (99 °F) 76 18 127/80      Pulse Ox Temp Source Heart Rate Source Patient Position   95 % Temporal Monitor Sitting      BP Location FiO2 (%)     Left arm --       Physical Exam      ED Course [facility]   ED Course as of 10/01/24 2111   Tue Oct 01, 2024   1218 Blood Gas Arterial Full Panel [SK]   1355 Received report of CT showing fracture of the femur.  Subsequently called orthopedic [facility] [employer] and this individual was not available.  Page has been made to orthopedic resident. [SK]   2803 EKG, interpreted by me: sinus rhythm, rate 59 bpm. Low voltage QRS with slight biphasic P wave. Prolonged NV at 210 ms consistent with 1st degree AV block. Normal axis. No acute ST elevation or depression. No acute T wave abnormalities. Qtc 388. [JG]      ED Course User Index  [JG] Lissette Francis MD  [SK] Walt Peng PA-C         Diagnoses as of 10/01/24 2111   Closed nondisplaced fracture of greater trochanter of left femur, initial encounter                 No data recorded      Laly Coma Scale Score: 15 (10/01/24 1900 : Oswaldo Garcia RN)                           Medical Decision Making  Labs Reviewed  CBC WITH AUTO DIFFERENTIAL - Abnormal     WBC                           9.1                    nRBC                          0.0                    RBC                           4.17 (*)               Hemoglobin                    13.3 (*)               Hematocrit                    40.6 (*)               MCV                           97                     MCH                           31.9                   MCHC                          32.8                   RDW                           12.6                   Platelets                     191                    Neutrophils %                 70.0                   Immature Granulocytes %, Automated   0.4                    Lymphocytes %                 16.9                   Monocytes %                   11.9                   Eosinophils %                 0.0                    Basophils %                   0.8                    Neutrophils Absolute          6.38 (*)               Immature Granulocytes Absolute, Au*   0.04                   Lymphocytes Absolute          1.54                   Monocytes Absolute            1.08 (*)               Eosinophils Absolute          0.00                   Basophils Absolute            0.07                COMPREHENSIVE METABOLIC PANEL - Abnormal     Glucose                       116 (*)                Sodium                        134 (*)                Potassium                     4.0                    Chloride                      96 (*)                 Bicarbonate                   31                     Anion Gap                     11                     Urea Nitrogen                 10                     Creatinine                    0.69                   eGFR                          >90                    Calcium                       8.8                    Albumin                        4.2                    Alkaline Phosphatase          54                     Total Protein                 6.9                    AST                           20                     Bilirubin, Total              1.1                    ALT                           17                  PROTIME-INR - Normal     Protime                       12.5                   INR                           1.1                 APTT - Normal     aPTT                          27                         Narrative: The APTT is no longer used for monitoring Unfractionated Heparin Therapy. For monitoring Heparin Therapy, use the Heparin Assay.  TYPE AND SCREEN     ABO TYPE                      A                      Rh TYPE                       POS                    ANTIBODY SCREEN               NEG                 CBC  BASIC METABOLIC PANEL        XR hip left with pelvis when performed 2 or 3 views   Final Result    The left femoral neck fracture described on the CT scan of earlier the    same day is much less conspicuous by plain radiography.    Signed by Matthew Arriola MD     CT hip left wo IV contrast   Final Result    Acute comminuted fracture of the left greater trochanter and left    superolateral femoral neck.    Degenerative change of both hip joints and lower lumbar spine.    Signed by Ray Prescott MD     CT pelvis wo IV contrast   Final Result    Acute comminuted fracture of the left greater trochanter and left    superolateral femoral neck.    Degenerative change of both hip joints and lower lumbar spine.    Signed by Ray Prescott MD         Patient presents with left hip pain after fall 2 days ago.  Differential diagnosis of fracture, contusion, dislocation, sprain, strain, vascular compromise, compartment syndrome.  Examination shows tenderness painful range of motion of the left hip with no focal tenderness or ecchymosis with normal neurovascular exam and compartments with full ROM.  No evidence of neurovascular  injury or compartment syndrome.  Patient had x-ray performed yesterday which showed no fracture.  Since he is having persistent pain making it difficult to ambulate CT scan of the hip and pelvis ordered.  Given Norco for pain.    CT scan does show fracture of the trochanter and femoral neck.  Consulted orthopedic services including Dr. Dykes.  Indicated this would likely require surgery.  Requested hip and pelvis imaging with plain films since we could not access the prior films to help with operative planning.  Routine EKG and preoperative labs ordered.  His pain is improved with the Norco.  Based on imaging patient will require surgical intervention.  Requested admission to internal medicine with consultation of surgery.  Patient is to be n.p.o. after midnight.  Patient is currently in hemodynamically stable condition.        Procedure  Procedures     Walt Peng PA-C  10/01/24 1606       Walt Peng PA-C  10/01/24 3627

## 2024-10-02 ENCOUNTER — APPOINTMENT (OUTPATIENT)
Dept: RADIOLOGY | Facility: HOSPITAL | Age: 74
End: 2024-10-02
Payer: MEDICARE

## 2024-10-02 LAB
ANION GAP SERPL CALC-SCNC: 12 MMOL/L (ref 10–20)
ATRIAL RATE: 59 BPM
BUN SERPL-MCNC: 11 MG/DL (ref 6–23)
CALCIUM SERPL-MCNC: 8.4 MG/DL (ref 8.6–10.3)
CHLORIDE SERPL-SCNC: 100 MMOL/L (ref 98–107)
CO2 SERPL-SCNC: 27 MMOL/L (ref 21–32)
CREAT SERPL-MCNC: 0.67 MG/DL (ref 0.5–1.3)
EGFRCR SERPLBLD CKD-EPI 2021: >90 ML/MIN/1.73M*2
ERYTHROCYTE [DISTWIDTH] IN BLOOD BY AUTOMATED COUNT: 12.4 % (ref 11.5–14.5)
GLUCOSE SERPL-MCNC: 119 MG/DL (ref 74–99)
HCT VFR BLD AUTO: 37.2 % (ref 41–52)
HGB BLD-MCNC: 12.3 G/DL (ref 13.5–17.5)
MCH RBC QN AUTO: 32.3 PG (ref 26–34)
MCHC RBC AUTO-ENTMCNC: 33.1 G/DL (ref 32–36)
MCV RBC AUTO: 98 FL (ref 80–100)
NRBC BLD-RTO: 0 /100 WBCS (ref 0–0)
P AXIS: 146 DEGREES
P OFFSET: 149 MS
P ONSET: 113 MS
PLATELET # BLD AUTO: 181 X10*3/UL (ref 150–450)
POTASSIUM SERPL-SCNC: 4 MMOL/L (ref 3.5–5.3)
PR INTERVAL: 210 MS
Q ONSET: 218 MS
QRS COUNT: 9 BEATS
QRS DURATION: 88 MS
QT INTERVAL: 392 MS
QTC CALCULATION(BAZETT): 388 MS
QTC FREDERICIA: 390 MS
R AXIS: 58 DEGREES
RBC # BLD AUTO: 3.81 X10*6/UL (ref 4.5–5.9)
SODIUM SERPL-SCNC: 135 MMOL/L (ref 136–145)
T AXIS: 51 DEGREES
T OFFSET: 414 MS
VENTRICULAR RATE: 59 BPM
WBC # BLD AUTO: 8.7 X10*3/UL (ref 4.4–11.3)

## 2024-10-02 PROCEDURE — 36415 COLL VENOUS BLD VENIPUNCTURE: CPT | Performed by: PHYSICIAN ASSISTANT

## 2024-10-02 PROCEDURE — 2500000004 HC RX 250 GENERAL PHARMACY W/ HCPCS (ALT 636 FOR OP/ED): Performed by: PHYSICIAN ASSISTANT

## 2024-10-02 PROCEDURE — 2500000001 HC RX 250 WO HCPCS SELF ADMINISTERED DRUGS (ALT 637 FOR MEDICARE OP): Performed by: PHYSICIAN ASSISTANT

## 2024-10-02 PROCEDURE — 85027 COMPLETE CBC AUTOMATED: CPT | Performed by: PHYSICIAN ASSISTANT

## 2024-10-02 PROCEDURE — 1100000001 HC PRIVATE ROOM DAILY

## 2024-10-02 PROCEDURE — 99233 SBSQ HOSP IP/OBS HIGH 50: CPT | Performed by: INTERNAL MEDICINE

## 2024-10-02 PROCEDURE — 82374 ASSAY BLOOD CARBON DIOXIDE: CPT | Performed by: PHYSICIAN ASSISTANT

## 2024-10-02 PROCEDURE — 2500000002 HC RX 250 W HCPCS SELF ADMINISTERED DRUGS (ALT 637 FOR MEDICARE OP, ALT 636 FOR OP/ED): Performed by: PHYSICIAN ASSISTANT

## 2024-10-02 ASSESSMENT — COGNITIVE AND FUNCTIONAL STATUS - GENERAL
DAILY ACTIVITIY SCORE: 24
DAILY ACTIVITIY SCORE: 24
MOBILITY SCORE: 21
MOBILITY SCORE: 24
WALKING IN HOSPITAL ROOM: A LITTLE
CLIMB 3 TO 5 STEPS WITH RAILING: A LOT

## 2024-10-02 ASSESSMENT — PAIN SCALES - PAIN ASSESSMENT IN ADVANCED DEMENTIA (PAINAD): TOTALSCORE: MEDICATION (SEE MAR)

## 2024-10-02 ASSESSMENT — PAIN SCALES - GENERAL
PAINLEVEL_OUTOF10: 8
PAINLEVEL_OUTOF10: 0 - NO PAIN
PAINLEVEL_OUTOF10: 1
PAINLEVEL_OUTOF10: 2
PAINLEVEL_OUTOF10: 8
PAINLEVEL_OUTOF10: 1
PAINLEVEL_OUTOF10: 3
PAINLEVEL_OUTOF10: 7

## 2024-10-02 ASSESSMENT — PAIN - FUNCTIONAL ASSESSMENT
PAIN_FUNCTIONAL_ASSESSMENT: 0-10

## 2024-10-02 ASSESSMENT — PAIN DESCRIPTION - LOCATION
LOCATION: HIP

## 2024-10-02 ASSESSMENT — PAIN DESCRIPTION - ORIENTATION
ORIENTATION: LEFT
ORIENTATION: LEFT

## 2024-10-02 ASSESSMENT — ACTIVITIES OF DAILY LIVING (ADL): LACK_OF_TRANSPORTATION: NO

## 2024-10-02 NOTE — PROGRESS NOTES
Occupational Therapy                 Therapy Communication Note    Patient Name: James Viera  MRN: [id number]  Department: [facility]LEX UNIT  Room: 02/02-A  Today's Date: 10/2/2024     Discipline: Occupational Therapy    Missed Visit Reason: Missed Visit Reason: Patient placed on medical hold (per MD, wait until after surgery for therapy)    Missed Time: Attempt

## 2024-10-02 NOTE — TREATMENT PLAN
[facility] charge nurse and control desk team notified me that OR room not available today due to caseload and emergencies.  They are not able to give me a start time for Mr. Viera's case as an add-on tonight due to the cases currently underway.  They informed me that there is availability of OR room tomorrow and team recommended placing Mr. Viera on the schedule for tomorrow.  I am at [place] OR tomorrow and so I am not available.  I discussed patient's case with my excellent colleague Dr. Alicea and he is able to do Mr. Viera's procedure tomorrow and take over care.  Above discussed with patient and his questions were answered in detail.  Diet order placed, along with modification to NPO order for after midnight tonight.

## 2024-10-02 NOTE — PROGRESS NOTES
"James Viera is a 74 y.o. male on day 1 of admission presenting with Closed fracture of greater trochanter of left femur.    Subjective   No acute events overnight. Lying in bed comfortably. Notes if he doesn't move he feels ok.        Objective     VITALS  Blood pressure 145/79, pulse 63, temperature 36.8 °C (98.3 °F), temperature source Temporal, resp. rate 16, height 1.676 m (5' 6\"), weight 68 kg (150 lb), SpO2 95%.  Physical Exam  Vitals and nursing note reviewed.   Constitutional:       General: He is not in acute distress.     Appearance: Normal appearance. He is not ill-appearing.   HENT:      Head: Normocephalic and atraumatic.      Mouth/Throat:      Mouth: Mucous membranes are moist.      Pharynx: Oropharynx is clear.   Eyes:      Extraocular Movements: Extraocular movements intact.      Conjunctiva/sclera: Conjunctivae normal.   Cardiovascular:      Rate and Rhythm: Normal rate and regular rhythm.      Heart sounds: Normal heart sounds. No murmur heard.     No friction rub. No gallop.   Pulmonary:      Effort: Pulmonary effort is normal.      Breath sounds: Normal breath sounds. No wheezing or rales.   Abdominal:      General: Bowel sounds are normal. There is no distension.      Palpations: Abdomen is soft.      Tenderness: There is no abdominal tenderness. There is no guarding.   Musculoskeletal:         General: No swelling or tenderness.      Right lower leg: No edema.      Left lower leg: No edema.      Comments: L leg rotated externally.    Skin:     General: Skin is warm and dry.      Capillary Refill: Capillary refill takes less than 2 seconds.   Neurological:      General: No focal deficit present.      Mental Status: He is alert and oriented to person, place, and time.   Psychiatric:         Mood and Affect: Mood normal.           Intake/Output last 3 Shifts:  I/O last 3 completed shifts:  In: - (0 mL/kg)   Out: 300 (4.4 mL/kg) [Urine:300 (0.1 mL/kg/hr)]  Weight: 68 kg     Relevant " Results  Results for orders placed or performed during the hospital encounter of 10/01/24 (from the past 24 hour(s))   ECG 12 lead   Result Value Ref Range    Ventricular Rate 59 BPM    Atrial Rate 59 BPM    IN Interval 210 ms    QRS Duration 88 ms    QT Interval 392 ms    QTC Calculation(Bazett) 388 ms    P Axis 146 degrees    R Axis 58 degrees    T Axis 51 degrees    QRS Count 9 beats    Q Onset 218 ms    P Onset 113 ms    P Offset 149 ms    T Offset 414 ms    QTC Fredericia 390 ms   CBC and Auto Differential   Result Value Ref Range    WBC 9.1 4.4 - 11.3 x10*3/uL    nRBC 0.0 0.0 - 0.0 /100 WBCs    RBC 4.17 (L) 4.50 - 5.90 x10*6/uL    Hemoglobin 13.3 (L) 13.5 - 17.5 g/dL    Hematocrit 40.6 (L) 41.0 - 52.0 %    MCV 97 80 - 100 fL    MCH 31.9 26.0 - 34.0 pg    MCHC 32.8 32.0 - 36.0 g/dL    RDW 12.6 11.5 - 14.5 %    Platelets 191 150 - 450 x10*3/uL    Neutrophils % 70.0 40.0 - 80.0 %    Immature Granulocytes %, Automated 0.4 0.0 - 0.9 %    Lymphocytes % 16.9 13.0 - 44.0 %    Monocytes % 11.9 2.0 - 10.0 %    Eosinophils % 0.0 0.0 - 6.0 %    Basophils % 0.8 0.0 - 2.0 %    Neutrophils Absolute 6.38 (H) 1.60 - 5.50 x10*3/uL    Immature Granulocytes Absolute, Automated 0.04 0.00 - 0.50 x10*3/uL    Lymphocytes Absolute 1.54 0.80 - 3.00 x10*3/uL    Monocytes Absolute 1.08 (H) 0.05 - 0.80 x10*3/uL    Eosinophils Absolute 0.00 0.00 - 0.40 x10*3/uL    Basophils Absolute 0.07 0.00 - 0.10 x10*3/uL   Comprehensive metabolic panel   Result Value Ref Range    Glucose 116 (H) 74 - 99 mg/dL    Sodium 134 (L) 136 - 145 mmol/L    Potassium 4.0 3.5 - 5.3 mmol/L    Chloride 96 (L) 98 - 107 mmol/L    Bicarbonate 31 21 - 32 mmol/L    Anion Gap 11 10 - 20 mmol/L    Urea Nitrogen 10 6 - 23 mg/dL    Creatinine 0.69 0.50 - 1.30 mg/dL    eGFR >90 >60 mL/min/1.73m*2    Calcium 8.8 8.6 - 10.3 mg/dL    Albumin 4.2 3.4 - 5.0 g/dL    Alkaline Phosphatase 54 33 - 136 U/L    Total Protein 6.9 6.4 - 8.2 g/dL    AST 20 9 - 39 U/L    Bilirubin, Total 1.1  0.0 - 1.2 mg/dL    ALT 17 10 - 52 U/L   Protime-INR   Result Value Ref Range    Protime 12.5 9.8 - 12.8 seconds    INR 1.1 0.9 - 1.1   aPTT   Result Value Ref Range    aPTT 27 27 - 38 seconds   Type And Screen   Result Value Ref Range    ABO TYPE A     Rh TYPE POS     ANTIBODY SCREEN NEG    CBC   Result Value Ref Range    WBC 8.7 4.4 - 11.3 x10*3/uL    nRBC 0.0 0.0 - 0.0 /100 WBCs    RBC 3.81 (L) 4.50 - 5.90 x10*6/uL    Hemoglobin 12.3 (L) 13.5 - 17.5 g/dL    Hematocrit 37.2 (L) 41.0 - 52.0 %    MCV 98 80 - 100 fL    MCH 32.3 26.0 - 34.0 pg    MCHC 33.1 32.0 - 36.0 g/dL    RDW 12.4 11.5 - 14.5 %    Platelets 181 150 - 450 x10*3/uL   Basic metabolic panel   Result Value Ref Range    Glucose 119 (H) 74 - 99 mg/dL    Sodium 135 (L) 136 - 145 mmol/L    Potassium 4.0 3.5 - 5.3 mmol/L    Chloride 100 98 - 107 mmol/L    Bicarbonate 27 21 - 32 mmol/L    Anion Gap 12 10 - 20 mmol/L    Urea Nitrogen 11 6 - 23 mg/dL    Creatinine 0.67 0.50 - 1.30 mg/dL    eGFR >90 >60 mL/min/1.73m*2    Calcium 8.4 (L) 8.6 - 10.3 mg/dL       Imaging Results  XR chest 1 view   Final Result   Allowing for the aforementioned limitation, unremarkable chest.        Signed by: Kunal Yip 10/1/2024 5:05 PM   Dictation workstation:   [id number]      XR hip left with pelvis when performed 2 or 3 views   Final Result   The left femoral neck fracture described on the CT scan of earlier the   same day is much less conspicuous by plain radiography.   Signed by Matthew Arriola MD      CT hip left wo IV contrast   Final Result   Acute comminuted fracture of the left greater trochanter and left   superolateral femoral neck.   Degenerative change of both hip joints and lower lumbar spine.   Signed by Ray Prescott MD      CT pelvis wo IV contrast   Final Result   Acute comminuted fracture of the left greater trochanter and left   superolateral femoral neck.   Degenerative change of both hip joints and lower lumbar spine.   Signed by Ray Prescott MD       FL less than 1 hour    (Results Pending)       Medications:  atorvastatin, 10 mg, oral, Daily  enoxaparin, 40 mg, subcutaneous, q24h  escitalopram, 5 mg, oral, Daily  sennosides, 2 tablet, oral, BID  tamsulosin, 0.4 mg, oral, Nightly       PRN medications: acetaminophen **OR** acetaminophen **OR** acetaminophen, ondansetron ODT **OR** ondansetron, oxyCODONE, traMADol        Assessment/Plan          Principal Problem:    Closed fracture of greater trochanter of left femur  Active Problems:    Closed nondisplaced fracture of greater trochanter of left femur, initial encounter    L hip fx  -Ortho following  -To OR later today.  -Pain meds   -IVF  -NPO  -PT/OT to see after     HLD  -Atorvastatin     DVT Prophylaxis:  Lovenox subq    Disposition:  OR today     Jean Elaine, DO [facility]

## 2024-10-02 NOTE — PROGRESS NOTES
Physical Therapy                 Therapy Communication Note    Patient Name: James Viera  MRN: [id number]  Department: [facility] FLEX UNIT  Room: 02/02-A  Today's Date: 10/2/2024     Discipline: Physical Therapy    Missed Visit Reason: Missed Visit Reason: Patient placed on medical hold    Comment: Pt awaiting surgery for repair of L hip fx.  PT to follow up as appropriate on POD 1. Dr Dykes (ortho) aware and in agreement with plan.

## 2024-10-02 NOTE — H&P
[facility] Department of Orthopaedic Surgery   Surgical History & Physical <30 Days  10/02/24    Reason for Surgery: L GT fx w/ IT extension  Planned Procedure: CMN L hip    History & Physical Reviewed:  I have reviewed the History and Physical for obtained within the last 30 days. Relevant findings and updates are noted below:  No significant changes.    Home medications were reviewed with significant updates noted below:  No significant changes.    ERAS patient?: No    COVID-19 Risk Consent:   Surgeon has reviewed the key risks related to peng COVID-19 and subsequent sequelae.     Miguelito Cuevas MD  Orthopaedic Surgery, PGY-3  Available by Epic Chat  10/02/24  2:39 PM

## 2024-10-02 NOTE — PROGRESS NOTES
10/02/24 0729   [facility] Disability Status   Are you deaf or do you have serious difficulty hearing? N   Are you blind or do you have serious difficulty seeing, even when wearing glasses? N   Because of a physical, mental, or emotional condition, do you have serious difficulty concentrating, remembering, or making decisions? (5 years old or older) N   Do you have serious difficulty walking or climbing stairs? Y  (femur fx)   Do you have serious difficulty dressing or bathing? N   Because of a physical, mental, or emotional condition, do you have serious difficulty doing errands alone such as visiting the doctor? Y

## 2024-10-02 NOTE — CARE PLAN
The patient's goals for the shift include      The clinical goals for the shift include Pt will remain safe and free from falls throughout shift.    Problem: Pain - Adult  Goal: Verbalizes/displays adequate comfort level or baseline comfort level  Outcome: Progressing     Problem: Safety - Adult  Goal: Free from fall injury  Outcome: Progressing     Problem: Discharge Planning  Goal: Discharge to home or other facility with appropriate resources  Outcome: Progressing     Problem: Chronic Conditions and Co-morbidities  Goal: Patient's chronic conditions and co-morbidity symptoms are monitored and maintained or improved  Outcome: Progressing     Problem: Fall/Injury  Goal: Not fall by end of shift  Outcome: Progressing  Goal: Be free from injury by end of the shift  Outcome: Progressing  Goal: Verbalize understanding of personal risk factors for fall in the hospital  Outcome: Progressing  Goal: Verbalize understanding of risk factor reduction measures to prevent injury from fall in the home  Outcome: Progressing  Goal: Use assistive devices by end of the shift  Outcome: Progressing  Goal: Pace activities to prevent fatigue by end of the shift  Outcome: Progressing

## 2024-10-02 NOTE — CARE PLAN
The patient's goals for the shift include      The clinical goals for the shift include pt will remain safe and hds with pain controlled  i

## 2024-10-02 NOTE — PROGRESS NOTES
"Orthopaedic Surgery Progress Note    S:  No acute events overnight. Pain well controlled. Denies chest pain, shortness of breath, or fevers. Waiting for surgery.     O:  /79   Pulse 63   Temp 36.8 °C (98.3 °F) (Temporal)   Resp 16   Ht 1.676 m (5' 6\")   Wt 68 kg (150 lb)   SpO2 95%   BMI 24.21 kg/m²     Gen: arousable, NAD, appropriately conversational  Cardiac: RRR to peripheral palpation  Resp: nonlabored on RA  GI: soft, nondistended    MSK:  Left Lower Extremity:   -Skin intact  -Tender at site of injury with painful ROM  -Fires DF/PF/EHL/FHL  -SILT in saph/sural/SPN/DPN distributions  -Foot warm, well perfused  -Palpable DP pulse, brisk cap refill  -Compartments soft and compressible      A/P: 74 y.o. male w/ L GT fx w/ IT extension pending IMN L hip w/ Dr. Dykes today 10/2, however unfortunately given no OR availability case is being moved to tomorrow w/ Dr. Alicea.    - C/p for CMN L hip w/ Dr. Alicea 10/3  - NPO midnight  - Weightbearing: NWB LLE  - DVT PPx: SCDs, DVT chemoppx per primary  - Pain: Multimodal pain regimen per primary  - Antibiotics: Pending OR      Miguelito Cuevas MD  Orthopaedic Surgery, PGY-3  Available by Epic Chat  10/02/24  2:37 PM   "

## 2024-10-02 NOTE — PROGRESS NOTES
Physical Therapy                 Therapy Communication Note    Patient Name: James Viera  MRN: [id number]  Department: [facility] A 7  Room: [facility]  Today's Date: 10/2/2024     Discipline: Physical Therapy    Comment: Pt to go to OR tomorrow d/t no OR availability today.  Will see for PT eval on POD 1 as appropriate.

## 2024-10-02 NOTE — PROGRESS NOTES
Transitional Care Coordination Progress Note:  Plan per Medical/Surgical team: treatment of fall with femur fx with ortho surgery today  Status: Inpatient   Payor source: medicare A/B  Discharge disposition: Home with wife   Potential Barriers: PT/OT evals pending  Probable SNF placement- need choices to start referrals  Need 3 midnight inpatient stay  ADOD: 10/4/2024   PAUL Beth RN, BSN Transitional Care Coordinator ED# [phone]      10/02/24 0729   Discharge Planning   Living Arrangements Spouse/significant other   Support Systems Spouse/significant other   Assistance Needed Ortho surgery today  PT/OT evals pending  Probable SNF placement- need choices to start referrals  Need 3 midnight inpatient stay   Type of Residence Private residence   Number of Stairs to Enter Residence 2   Number of Stairs Within Residence 15   Home or Post Acute Services Post acute facilities (Rehab/SNF/etc)   Type of Post Acute Facility Services Skilled nursing   Expected Discharge Disposition SNF   Does the patient need discharge transport arranged? Yes   RoundTrip coordination needed? Yes   Has discharge transport been arranged? No   Financial Resource Strain   How hard is it for you to pay for the very basics like food, housing, medical care, and heating? Not hard   Housing Stability   In the last 12 months, was there a time when you were not able to pay the mortgage or rent on time? N   In the past 12 months, how many times have you moved where you were living? 1   At any time in the past 12 months, were you homeless or living in a shelter (including now)? N   Transportation Needs   In the past 12 months, has lack of transportation kept you from medical appointments or from getting medications? no   In the past 12 months, has lack of transportation kept you from meetings, work, or from getting things needed for daily living? No

## 2024-10-03 ENCOUNTER — APPOINTMENT (OUTPATIENT)
Dept: RADIOLOGY | Facility: HOSPITAL | Age: 74
End: 2024-10-03
Payer: MEDICARE

## 2024-10-03 ENCOUNTER — ANESTHESIA (OUTPATIENT)
Dept: OPERATING ROOM | Facility: HOSPITAL | Age: 74
End: 2024-10-03
Payer: MEDICARE

## 2024-10-03 ENCOUNTER — ANESTHESIA EVENT (OUTPATIENT)
Dept: OPERATING ROOM | Facility: HOSPITAL | Age: 74
End: 2024-10-03
Payer: MEDICARE

## 2024-10-03 LAB
ANION GAP SERPL CALC-SCNC: 13 MMOL/L (ref 10–20)
BUN SERPL-MCNC: 11 MG/DL (ref 6–23)
CALCIUM SERPL-MCNC: 8.8 MG/DL (ref 8.6–10.3)
CHLORIDE SERPL-SCNC: 100 MMOL/L (ref 98–107)
CO2 SERPL-SCNC: 26 MMOL/L (ref 21–32)
CREAT SERPL-MCNC: 0.69 MG/DL (ref 0.5–1.3)
EGFRCR SERPLBLD CKD-EPI 2021: >90 ML/MIN/1.73M*2
ERYTHROCYTE [DISTWIDTH] IN BLOOD BY AUTOMATED COUNT: 12.3 % (ref 11.5–14.5)
GLUCOSE SERPL-MCNC: 116 MG/DL (ref 74–99)
HCT VFR BLD AUTO: 42.2 % (ref 41–52)
HGB BLD-MCNC: 13.8 G/DL (ref 13.5–17.5)
MCH RBC QN AUTO: 31.6 PG (ref 26–34)
MCHC RBC AUTO-ENTMCNC: 32.7 G/DL (ref 32–36)
MCV RBC AUTO: 97 FL (ref 80–100)
NRBC BLD-RTO: 0 /100 WBCS (ref 0–0)
PLATELET # BLD AUTO: 210 X10*3/UL (ref 150–450)
POTASSIUM SERPL-SCNC: 4.2 MMOL/L (ref 3.5–5.3)
RBC # BLD AUTO: 4.37 X10*6/UL (ref 4.5–5.9)
SODIUM SERPL-SCNC: 135 MMOL/L (ref 136–145)
WBC # BLD AUTO: 11.8 X10*3/UL (ref 4.4–11.3)

## 2024-10-03 PROCEDURE — 3700000001 HC GENERAL ANESTHESIA TIME - INITIAL BASE CHARGE: Performed by: ORTHOPAEDIC SURGERY

## 2024-10-03 PROCEDURE — 2720000007 HC OR 272 NO HCPCS: Performed by: ORTHOPAEDIC SURGERY

## 2024-10-03 PROCEDURE — 3600000009 HC OR TIME - EACH INCREMENTAL 1 MINUTE - PROCEDURE LEVEL FOUR: Performed by: ORTHOPAEDIC SURGERY

## 2024-10-03 PROCEDURE — 3700000002 HC GENERAL ANESTHESIA TIME - EACH INCREMENTAL 1 MINUTE: Performed by: ORTHOPAEDIC SURGERY

## 2024-10-03 PROCEDURE — C1713 ANCHOR/SCREW BN/BN,TIS/BN: HCPCS | Performed by: ORTHOPAEDIC SURGERY

## 2024-10-03 PROCEDURE — 7100000001 HC RECOVERY ROOM TIME - INITIAL BASE CHARGE: Performed by: ORTHOPAEDIC SURGERY

## 2024-10-03 PROCEDURE — 99233 SBSQ HOSP IP/OBS HIGH 50: CPT | Performed by: INTERNAL MEDICINE

## 2024-10-03 PROCEDURE — 2500000005 HC RX 250 GENERAL PHARMACY W/O HCPCS: Performed by: ANESTHESIOLOGY

## 2024-10-03 PROCEDURE — 2500000005 HC RX 250 GENERAL PHARMACY W/O HCPCS: Performed by: NURSE ANESTHETIST, CERTIFIED REGISTERED

## 2024-10-03 PROCEDURE — 0QS706Z REPOSITION LEFT UPPER FEMUR WITH INTRAMEDULLARY INTERNAL FIXATION DEVICE, OPEN APPROACH: ICD-10-PCS | Performed by: ORTHOPAEDIC SURGERY

## 2024-10-03 PROCEDURE — 27245 TREAT THIGH FRACTURE: CPT | Performed by: ORTHOPAEDIC SURGERY

## 2024-10-03 PROCEDURE — 2500000004 HC RX 250 GENERAL PHARMACY W/ HCPCS (ALT 636 FOR OP/ED): Performed by: ANESTHESIOLOGY

## 2024-10-03 PROCEDURE — 9420000001 HC RT PATIENT EDUCATION 5 MIN

## 2024-10-03 PROCEDURE — 2500000005 HC RX 250 GENERAL PHARMACY W/O HCPCS: Performed by: ORTHOPAEDIC SURGERY

## 2024-10-03 PROCEDURE — 85027 COMPLETE CBC AUTOMATED: CPT | Performed by: INTERNAL MEDICINE

## 2024-10-03 PROCEDURE — 2500000002 HC RX 250 W HCPCS SELF ADMINISTERED DRUGS (ALT 637 FOR MEDICARE OP, ALT 636 FOR OP/ED): Performed by: STUDENT IN AN ORGANIZED HEALTH CARE EDUCATION/TRAINING PROGRAM

## 2024-10-03 PROCEDURE — 2500000004 HC RX 250 GENERAL PHARMACY W/ HCPCS (ALT 636 FOR OP/ED): Performed by: NURSE ANESTHETIST, CERTIFIED REGISTERED

## 2024-10-03 PROCEDURE — 2500000001 HC RX 250 WO HCPCS SELF ADMINISTERED DRUGS (ALT 637 FOR MEDICARE OP): Performed by: PHYSICIAN ASSISTANT

## 2024-10-03 PROCEDURE — 2500000001 HC RX 250 WO HCPCS SELF ADMINISTERED DRUGS (ALT 637 FOR MEDICARE OP): Performed by: STUDENT IN AN ORGANIZED HEALTH CARE EDUCATION/TRAINING PROGRAM

## 2024-10-03 PROCEDURE — 3600000004 HC OR TIME - INITIAL BASE CHARGE - PROCEDURE LEVEL FOUR: Performed by: ORTHOPAEDIC SURGERY

## 2024-10-03 PROCEDURE — 2780000003 HC OR 278 NO HCPCS: Performed by: ORTHOPAEDIC SURGERY

## 2024-10-03 PROCEDURE — 2500000004 HC RX 250 GENERAL PHARMACY W/ HCPCS (ALT 636 FOR OP/ED): Performed by: PHYSICIAN ASSISTANT

## 2024-10-03 PROCEDURE — 36415 COLL VENOUS BLD VENIPUNCTURE: CPT | Performed by: INTERNAL MEDICINE

## 2024-10-03 PROCEDURE — 2500000004 HC RX 250 GENERAL PHARMACY W/ HCPCS (ALT 636 FOR OP/ED): Performed by: STUDENT IN AN ORGANIZED HEALTH CARE EDUCATION/TRAINING PROGRAM

## 2024-10-03 PROCEDURE — 7100000002 HC RECOVERY ROOM TIME - EACH INCREMENTAL 1 MINUTE: Performed by: ORTHOPAEDIC SURGERY

## 2024-10-03 PROCEDURE — 80048 BASIC METABOLIC PNL TOTAL CA: CPT

## 2024-10-03 PROCEDURE — 76000 FLUOROSCOPY <1 HR PHYS/QHP: CPT | Mod: LT

## 2024-10-03 PROCEDURE — 1100000001 HC PRIVATE ROOM DAILY

## 2024-10-03 DEVICE — LAG SCREW, TI
Type: IMPLANTABLE DEVICE | Site: HIP | Status: FUNCTIONAL
Brand: GAMMA

## 2024-10-03 DEVICE — LOCKING SCREW, FULLY THREADED: Type: IMPLANTABLE DEVICE | Site: HIP | Status: FUNCTIONAL

## 2024-10-03 DEVICE — TROCHANTERIC NAIL KIT, TI
Type: IMPLANTABLE DEVICE | Site: HIP | Status: FUNCTIONAL
Brand: GAMMA

## 2024-10-03 RX ORDER — CEFAZOLIN SODIUM 2 G/100ML
2 INJECTION, SOLUTION INTRAVENOUS EVERY 8 HOURS
Status: COMPLETED | OUTPATIENT
Start: 2024-10-03 | End: 2024-10-04

## 2024-10-03 RX ORDER — POLYETHYLENE GLYCOL 3350 17 G/17G
17 POWDER, FOR SOLUTION ORAL DAILY
Status: DISCONTINUED | OUTPATIENT
Start: 2024-10-03 | End: 2024-10-04 | Stop reason: HOSPADM

## 2024-10-03 RX ORDER — MAGNESIUM HYDROXIDE 2400 MG/10ML
10 SUSPENSION ORAL DAILY PRN
Status: DISCONTINUED | OUTPATIENT
Start: 2024-10-03 | End: 2024-10-04 | Stop reason: HOSPADM

## 2024-10-03 RX ORDER — AMOXICILLIN 250 MG
2 CAPSULE ORAL 2 TIMES DAILY
Status: DISCONTINUED | OUTPATIENT
Start: 2024-10-03 | End: 2024-10-04 | Stop reason: HOSPADM

## 2024-10-03 RX ORDER — PROCHLORPERAZINE EDISYLATE 5 MG/ML
5 INJECTION INTRAMUSCULAR; INTRAVENOUS ONCE AS NEEDED
Status: DISCONTINUED | OUTPATIENT
Start: 2024-10-03 | End: 2024-10-03 | Stop reason: HOSPADM

## 2024-10-03 RX ORDER — LIDOCAINE HYDROCHLORIDE 20 MG/ML
INJECTION, SOLUTION EPIDURAL; INFILTRATION; INTRACAUDAL; PERINEURAL AS NEEDED
Status: DISCONTINUED | OUTPATIENT
Start: 2024-10-03 | End: 2024-10-03

## 2024-10-03 RX ORDER — SODIUM CHLORIDE, SODIUM LACTATE, POTASSIUM CHLORIDE, CALCIUM CHLORIDE 600; 310; 30; 20 MG/100ML; MG/100ML; MG/100ML; MG/100ML
100 INJECTION, SOLUTION INTRAVENOUS CONTINUOUS
Status: DISCONTINUED | OUTPATIENT
Start: 2024-10-03 | End: 2024-10-03 | Stop reason: HOSPADM

## 2024-10-03 RX ORDER — BISACODYL 10 MG/1
10 SUPPOSITORY RECTAL DAILY PRN
Status: DISCONTINUED | OUTPATIENT
Start: 2024-10-03 | End: 2024-10-04 | Stop reason: HOSPADM

## 2024-10-03 RX ORDER — NALOXONE HYDROCHLORIDE 0.4 MG/ML
0.2 INJECTION, SOLUTION INTRAMUSCULAR; INTRAVENOUS; SUBCUTANEOUS EVERY 5 MIN PRN
Status: DISCONTINUED | OUTPATIENT
Start: 2024-10-03 | End: 2024-10-04 | Stop reason: HOSPADM

## 2024-10-03 RX ORDER — SODIUM CHLORIDE 0.9 G/100ML
IRRIGANT IRRIGATION AS NEEDED
Status: DISCONTINUED | OUTPATIENT
Start: 2024-10-03 | End: 2024-10-03 | Stop reason: HOSPADM

## 2024-10-03 RX ORDER — PROCHLORPERAZINE MALEATE 10 MG
10 TABLET ORAL EVERY 6 HOURS PRN
Status: DISCONTINUED | OUTPATIENT
Start: 2024-10-03 | End: 2024-10-04 | Stop reason: HOSPADM

## 2024-10-03 RX ORDER — CYCLOBENZAPRINE HCL 5 MG
10 TABLET ORAL 3 TIMES DAILY PRN
Status: DISCONTINUED | OUTPATIENT
Start: 2024-10-03 | End: 2024-10-04 | Stop reason: HOSPADM

## 2024-10-03 RX ORDER — ACETAMINOPHEN 325 MG/1
650 TABLET ORAL EVERY 6 HOURS SCHEDULED
Status: DISCONTINUED | OUTPATIENT
Start: 2024-10-03 | End: 2024-10-04 | Stop reason: HOSPADM

## 2024-10-03 RX ORDER — ONDANSETRON 4 MG/1
4 TABLET, FILM COATED ORAL EVERY 8 HOURS PRN
Status: DISCONTINUED | OUTPATIENT
Start: 2024-10-03 | End: 2024-10-04 | Stop reason: HOSPADM

## 2024-10-03 RX ORDER — OXYCODONE HYDROCHLORIDE 5 MG/1
5 TABLET ORAL
Status: DISCONTINUED | OUTPATIENT
Start: 2024-10-03 | End: 2024-10-03 | Stop reason: HOSPADM

## 2024-10-03 RX ORDER — CEFAZOLIN 1 G/1
INJECTION, POWDER, FOR SOLUTION INTRAVENOUS AS NEEDED
Status: DISCONTINUED | OUTPATIENT
Start: 2024-10-03 | End: 2024-10-03

## 2024-10-03 RX ORDER — OXYCODONE HYDROCHLORIDE 5 MG/1
5 TABLET ORAL EVERY 4 HOURS PRN
Status: DISCONTINUED | OUTPATIENT
Start: 2024-10-03 | End: 2024-10-04 | Stop reason: HOSPADM

## 2024-10-03 RX ORDER — LABETALOL HYDROCHLORIDE 5 MG/ML
INJECTION, SOLUTION INTRAVENOUS AS NEEDED
Status: DISCONTINUED | OUTPATIENT
Start: 2024-10-03 | End: 2024-10-03

## 2024-10-03 RX ORDER — SODIUM CHLORIDE, SODIUM LACTATE, POTASSIUM CHLORIDE, CALCIUM CHLORIDE 600; 310; 30; 20 MG/100ML; MG/100ML; MG/100ML; MG/100ML
100 INJECTION, SOLUTION INTRAVENOUS CONTINUOUS
Status: ACTIVE | OUTPATIENT
Start: 2024-10-03 | End: 2024-10-04

## 2024-10-03 RX ORDER — PROCHLORPERAZINE EDISYLATE 5 MG/ML
10 INJECTION INTRAMUSCULAR; INTRAVENOUS EVERY 6 HOURS PRN
Status: DISCONTINUED | OUTPATIENT
Start: 2024-10-03 | End: 2024-10-04 | Stop reason: HOSPADM

## 2024-10-03 RX ORDER — FENTANYL CITRATE 50 UG/ML
INJECTION, SOLUTION INTRAMUSCULAR; INTRAVENOUS AS NEEDED
Status: DISCONTINUED | OUTPATIENT
Start: 2024-10-03 | End: 2024-10-03

## 2024-10-03 RX ORDER — ROCURONIUM BROMIDE 10 MG/ML
INJECTION, SOLUTION INTRAVENOUS AS NEEDED
Status: DISCONTINUED | OUTPATIENT
Start: 2024-10-03 | End: 2024-10-03

## 2024-10-03 RX ORDER — ASPIRIN 81 MG/1
81 TABLET ORAL 2 TIMES DAILY
Status: DISCONTINUED | OUTPATIENT
Start: 2024-10-03 | End: 2024-10-04 | Stop reason: HOSPADM

## 2024-10-03 RX ORDER — PROPOFOL 10 MG/ML
INJECTION, EMULSION INTRAVENOUS AS NEEDED
Status: DISCONTINUED | OUTPATIENT
Start: 2024-10-03 | End: 2024-10-03

## 2024-10-03 RX ORDER — ONDANSETRON HYDROCHLORIDE 2 MG/ML
4 INJECTION, SOLUTION INTRAVENOUS EVERY 8 HOURS PRN
Status: DISCONTINUED | OUTPATIENT
Start: 2024-10-03 | End: 2024-10-04 | Stop reason: HOSPADM

## 2024-10-03 RX ORDER — HYDROMORPHONE HYDROCHLORIDE 1 MG/ML
INJECTION, SOLUTION INTRAMUSCULAR; INTRAVENOUS; SUBCUTANEOUS AS NEEDED
Status: DISCONTINUED | OUTPATIENT
Start: 2024-10-03 | End: 2024-10-03

## 2024-10-03 RX ORDER — FERROUS SULFATE, DRIED 160(50) MG
1 TABLET, EXTENDED RELEASE ORAL 2 TIMES DAILY
Status: DISCONTINUED | OUTPATIENT
Start: 2024-10-03 | End: 2024-10-04 | Stop reason: HOSPADM

## 2024-10-03 RX ORDER — OXYCODONE HYDROCHLORIDE 5 MG/1
10 TABLET ORAL EVERY 4 HOURS PRN
Status: DISCONTINUED | OUTPATIENT
Start: 2024-10-03 | End: 2024-10-04 | Stop reason: HOSPADM

## 2024-10-03 RX ORDER — PROCHLORPERAZINE 25 MG/1
25 SUPPOSITORY RECTAL EVERY 12 HOURS PRN
Status: DISCONTINUED | OUTPATIENT
Start: 2024-10-03 | End: 2024-10-04 | Stop reason: HOSPADM

## 2024-10-03 RX ORDER — ONDANSETRON HYDROCHLORIDE 2 MG/ML
4 INJECTION, SOLUTION INTRAVENOUS ONCE AS NEEDED
Status: DISCONTINUED | OUTPATIENT
Start: 2024-10-03 | End: 2024-10-03 | Stop reason: HOSPADM

## 2024-10-03 RX ORDER — DROPERIDOL 2.5 MG/ML
0.62 INJECTION, SOLUTION INTRAMUSCULAR; INTRAVENOUS ONCE AS NEEDED
Status: DISCONTINUED | OUTPATIENT
Start: 2024-10-03 | End: 2024-10-03 | Stop reason: HOSPADM

## 2024-10-03 RX ORDER — HYDRALAZINE HYDROCHLORIDE 20 MG/ML
5 INJECTION INTRAMUSCULAR; INTRAVENOUS EVERY 30 MIN PRN
Status: DISCONTINUED | OUTPATIENT
Start: 2024-10-03 | End: 2024-10-03 | Stop reason: HOSPADM

## 2024-10-03 RX ORDER — DIPHENHYDRAMINE HYDROCHLORIDE 50 MG/ML
12.5 INJECTION INTRAMUSCULAR; INTRAVENOUS EVERY 6 HOURS PRN
Status: DISCONTINUED | OUTPATIENT
Start: 2024-10-03 | End: 2024-10-04 | Stop reason: HOSPADM

## 2024-10-03 SDOH — HEALTH STABILITY: MENTAL HEALTH: CURRENT SMOKER: 0

## 2024-10-03 ASSESSMENT — COGNITIVE AND FUNCTIONAL STATUS - GENERAL
CLIMB 3 TO 5 STEPS WITH RAILING: A LOT
MOBILITY SCORE: 20
CLIMB 3 TO 5 STEPS WITH RAILING: A LOT
WALKING IN HOSPITAL ROOM: A LITTLE
MOVING TO AND FROM BED TO CHAIR: A LITTLE
MOVING TO AND FROM BED TO CHAIR: A LITTLE
DAILY ACTIVITIY SCORE: 24
WALKING IN HOSPITAL ROOM: A LITTLE
DAILY ACTIVITIY SCORE: 24
MOBILITY SCORE: 20

## 2024-10-03 ASSESSMENT — PAIN DESCRIPTION - LOCATION
LOCATION: HIP

## 2024-10-03 ASSESSMENT — PAIN DESCRIPTION - ORIENTATION
ORIENTATION: LEFT

## 2024-10-03 ASSESSMENT — PAIN SCALES - GENERAL
PAINLEVEL_OUTOF10: 8
PAINLEVEL_OUTOF10: 4
PAINLEVEL_OUTOF10: 2
PAINLEVEL_OUTOF10: 0 - NO PAIN
PAINLEVEL_OUTOF10: 7
PAINLEVEL_OUTOF10: 0 - NO PAIN
PAINLEVEL_OUTOF10: 1
PAINLEVEL_OUTOF10: 5 - MODERATE PAIN
PAINLEVEL_OUTOF10: 7
PAINLEVEL_OUTOF10: 4
PAINLEVEL_OUTOF10: 3
PAINLEVEL_OUTOF10: 8
PAINLEVEL_OUTOF10: 4

## 2024-10-03 ASSESSMENT — PAIN - FUNCTIONAL ASSESSMENT
PAIN_FUNCTIONAL_ASSESSMENT: 0-10
PAIN_FUNCTIONAL_ASSESSMENT: UNABLE TO SELF-REPORT
PAIN_FUNCTIONAL_ASSESSMENT: 0-10
PAIN_FUNCTIONAL_ASSESSMENT: UNABLE TO SELF-REPORT
PAIN_FUNCTIONAL_ASSESSMENT: 0-10

## 2024-10-03 ASSESSMENT — COLUMBIA-SUICIDE SEVERITY RATING SCALE - C-SSRS
1. IN THE PAST MONTH, HAVE YOU WISHED YOU WERE DEAD OR WISHED YOU COULD GO TO SLEEP AND NOT WAKE UP?: NO
6. HAVE YOU EVER DONE ANYTHING, STARTED TO DO ANYTHING, OR PREPARED TO DO ANYTHING TO END YOUR LIFE?: NO
2. HAVE YOU ACTUALLY HAD ANY THOUGHTS OF KILLING YOURSELF?: NO

## 2024-10-03 NOTE — DISCHARGE INSTRUCTIONS
Weight Bearing Instructions:  You may be Weight bearing as tolerated on your left lower extremity at all times.      Call your provider if:   Call if any drainage after 7 days, increased redness/warmth/swelling at incision site, pain/tenderness of calf, swelling of calf that does not respond to elevation, SOB/chest pain.    Dressing instructions:   Remove operative dressing from incision 7 days after surgery.  (May remove 1 day earlier or later depending on homecare nursing visit).  Wound may be open to air when dry. If there is continued drainage, cover with an abdominal pad and ACE wrap.  If the operative dressing was changed prior to 7 days post op, then remove the dressing 7 days from the time it was placed.    Follow up:  Please call to schedule a follow-up appointment with your orthopedic surgeon in 2-3 weeks.

## 2024-10-03 NOTE — PROGRESS NOTES
Occupational Therapy                 Therapy Communication Note    Patient Name: James Viera  MRN: [id number]  Department:   Room: 703/703-A  Today's Date: 10/3/2024     Discipline: Occupational Therapy      Comment: Pt to go to OR this date. Will follow up POD1 as appropriate.

## 2024-10-03 NOTE — PROGRESS NOTES
10/03/24 0827   Discharge Planning   Who is requesting discharge planning? Provider   Home or Post Acute Services In home services;Post acute facilities (Rehab/SNF/etc)   Type of Post Acute Facility Services Skilled nursing   Type of Home Care Services Home OT;Home PT   Expected Discharge Disposition Short Term A   Does the patient need discharge transport arranged? Yes   RoundTrip coordination needed? Yes   Has discharge transport been arranged? No     10/3/24 0827  Patient to have surgery today.  Will need PT/OT post op.  Discharge destination TBD.  Nandini Liriano RN [facility]

## 2024-10-03 NOTE — ANESTHESIA PREPROCEDURE EVALUATION
"Patient: James Viera    Procedure Information       Date/Time: 10/03/24 1100    Procedure: Left Hip Fracture Open Reduction Internal Fixation with Trochanteric Nail (Left: Hip)    Location: [facility] OR 17 / [facility] OR    Surgeons: Clifton Alicea MD                                                                                           Pre-Anesthesia Evaluation    James Viera is a 74 y.o. male presents for the above mentioned procedure. He is admitted to the hospital for Closed nondisplaced fracture of greater trochanter of left femur, initial encounter [S72.115A]. Today is Hospital Day: 3.Asymptomatic with >4 METS functional capacity (patient reported).   Past Medical History:   Diagnosis Date    Anxiety disorder 10/01/2024    BPH (benign prostatic hyperplasia) 07/27/2005    Hyperlipidemia 10/01/2024     Past Surgical History:   Procedure Laterality Date    TONSILLECTOMY  05/11/2015    Tonsillectomy     Social History   He reports that he has never smoked. He has never been exposed to tobacco smoke. He has never used smokeless tobacco. He reports that he does not drink alcohol and does not use drugs.  Allergies   Sulfa (sulfonamide antibiotics)  Home medications  Current Outpatient Medications   Medication Instructions    atorvastatin (LIPITOR) 10 mg, oral, Nightly    escitalopram (LEXAPRO) 5 mg, oral, Daily    tamsulosin (FLOMAX) 0.4 mg, oral, Nightly     Results from last 7 days   Lab Units 10/03/24  0739 10/02/24  0500 10/01/24  1730   WBC AUTO x10*3/uL 11.8* 8.7 9.1   HEMOGLOBIN g/dL 13.8 12.3* 13.3*   HEMATOCRIT % 42.2 37.2* 40.6*   PLATELETS AUTO x10*3/uL 210 181 191     Lab Results   Component Value Date/Time    PROTIME 12.5 10/01/2024 1730    INR 1.1 10/01/2024 1730    ABO A 10/01/2024 1730     No results for input(s): \"FERRITIN\", \"TIBC\", \"IRONSAT\" in the last 70805 hours.  Results from last 7 days   Lab Units 10/03/24  0739 10/02/24  0500 10/01/24  1730   EGFR mL/min/1.73m*2 >90 >90 >90 " "  ANION GAP mmol/L 13 12 11   BUN mg/dL 11 11 10   CREATININE mg/dL 0.69 0.67 0.69   SODIUM mmol/L 135* 135* 134*   POTASSIUM mmol/L 4.2 4.0 4.0   CHLORIDE mmol/L 100 100 96*   CO2 mmol/L 26 27 31   GLUCOSE mg/dL 116* 119* 116*     Results from last 7 days   Lab Units 10/03/24  0739 10/02/24  0500   CALCIUM mg/dL 8.8 8.4*         Results from last 7 days   Lab Units 10/01/24  1730   PROTEIN TOTAL g/dL 6.9   ALBUMIN g/dL 4.2   BILIRUBIN TOTAL mg/dL 1.1   ALK PHOS U/L 54   ALT U/L 17   AST U/L 20       EKG:  Encounter Date: 10/01/24   ECG 12 lead   Result Value    Ventricular Rate 59    Atrial Rate 59    OR Interval 210    QRS Duration 88    QT Interval 392    QTC Calculation(Bazett) 388    P Axis 146    R Axis 58    T Axis 51    QRS Count 9    Q Onset 218    P Onset 113    P Offset 149    T Offset 414    QTC Fredericia 390    Narrative    Unusual P axis, possible ectopic atrial bradycardia with undetermined rhythm irregularity  Possible Lateral infarct , age undetermined  Abnormal ECG  No previous ECGs available  See ED provider note for full interpretation and clinical correlation  Confirmed by Germania Brice ([id number]) on 10/2/2024 10:36:29 AM         Vitals range for last 24 hours   Heart Rate:  [63-76]   Temp:  [36.3 °C (97.4 °F)-36.8 °C (98.3 °F)]   Resp:  [16-18]   BP: (130-149)/(70-86)   Height:  [167.6 cm (5' 6\")]   Weight:  [68 kg (149 lb 14.6 oz)]   SpO2:  [93 %-95 %]      Visit Vitals  /78   Pulse 71   Temp 36.8 °C (98.2 °F) (Temporal)   Resp 16   Ht 1.676 m (5' 6\")   Wt 68 kg (149 lb 14.6 oz)   SpO2 94%   BMI 24.20 kg/m²   Smoking Status Never   BSA 1.78 m²     Medical Gas Therapy: None (Room air)    Imaging Results:  XR chest 1 view   Final Result   Allowing for the aforementioned limitation, unremarkable chest.        Signed by: Kunal Yip 10/1/2024 5:05 PM   Dictation workstation:   [id number]      XR hip left with pelvis when performed 2 or 3 views   Final Result   The left femoral neck " fracture described on the CT scan of earlier the   same day is much less conspicuous by plain radiography.   Signed by Matthew Arriola MD      CT hip left wo IV contrast   Final Result   Acute comminuted fracture of the left greater trochanter and left   superolateral femoral neck.   Degenerative change of both hip joints and lower lumbar spine.   Signed by Ray Prescott MD      CT pelvis wo IV contrast   Final Result   Acute comminuted fracture of the left greater trochanter and left   superolateral femoral neck.   Degenerative change of both hip joints and lower lumbar spine.   Signed by Ray Prescott MD      FL less than 1 hour    (Results Pending)     MR LUMBAR SPINE WO IV CONTRAST 08/23/2019  Impression  Multilevel degenerative changes most pronounced at L3-4 where there  is severe spinal canal narrowing.         Relevant Problems   Cardiac   (+) Hyperlipidemia      Neuro   (+) Anxiety disorder      /Renal   (+) BPH (benign prostatic hyperplasia)      Musculoskeletal   (+) Closed nondisplaced fracture of greater trochanter of left femur, initial encounter       Clinical information reviewed:   Tobacco  Allergies  Meds  Problems  Med Hx  Surg Hx   Fam Hx  Soc   Hx        NPO Detail:  NPO/Void Status  Date of Last Liquid: 10/03/24  Time of Last Liquid: 0926  Date of Last Solid: 10/02/24  Time of Last Solid: 0000         Physical Exam    Airway  Mallampati: II  TM distance: >3 FB  Neck ROM: full     Cardiovascular   Rhythm: regular  Rate: normal     Dental - normal exam     Pulmonary   Comments: Normal RR  Non-labored respiration    Abdominal            Anesthesia Plan    History of general anesthesia?: yes  History of complications of general anesthesia?: no    ASA 2     general   (General with ETT. Standard ASA monitoring)  The patient is not a current smoker.    intravenous induction   Postoperative administration of opioids is intended.  Anesthetic plan and risks discussed with patient.    Plan  discussed with CAA and CRNA.

## 2024-10-03 NOTE — PROGRESS NOTES
"James Viera is a 74 y.o. male on day 2 of admission presenting with Closed fracture of greater trochanter of left femur.    Subjective   Surgery delayed 2/2 OR availability yesterday, to go today. Pain well controlled when not moving. Wife at the bedside and updated.        Objective     VITALS  Blood pressure 145/82, pulse 63, temperature 37.1 °C (98.8 °F), temperature source Temporal, resp. rate 12, height 1.676 m (5' 6\"), weight 68 kg (149 lb 14.6 oz), SpO2 95%.  Physical Exam  Vitals and nursing note reviewed.   Constitutional:       General: He is not in acute distress.     Appearance: Normal appearance. He is not ill-appearing.   HENT:      Head: Normocephalic and atraumatic.      Mouth/Throat:      Mouth: Mucous membranes are moist.      Pharynx: Oropharynx is clear.   Eyes:      Extraocular Movements: Extraocular movements intact.      Conjunctiva/sclera: Conjunctivae normal.   Cardiovascular:      Rate and Rhythm: Normal rate and regular rhythm.      Heart sounds: Normal heart sounds. No murmur heard.     No friction rub. No gallop.   Pulmonary:      Effort: Pulmonary effort is normal.      Breath sounds: Normal breath sounds. No wheezing or rales.   Abdominal:      General: Bowel sounds are normal. There is no distension.      Palpations: Abdomen is soft.      Tenderness: There is no abdominal tenderness. There is no guarding.   Musculoskeletal:         General: No swelling or tenderness.      Right lower leg: No edema.      Left lower leg: No edema.      Comments: L leg rotated externally.    Skin:     General: Skin is warm and dry.      Capillary Refill: Capillary refill takes less than 2 seconds.   Neurological:      General: No focal deficit present.      Mental Status: He is alert and oriented to person, place, and time.   Psychiatric:         Mood and Affect: Mood normal.           Intake/Output last 3 Shifts:  I/O last 3 completed shifts:  In: 240 (3.5 mL/kg) [P.O.:240]  Out: 1500 (22 mL/kg) " [Urine:1500 (0.6 mL/kg/hr)]  Weight: 68 kg     Relevant Results  Results for orders placed or performed during the hospital encounter of 10/01/24 (from the past 24 hour(s))   CBC   Result Value Ref Range    WBC 11.8 (H) 4.4 - 11.3 x10*3/uL    nRBC 0.0 0.0 - 0.0 /100 WBCs    RBC 4.37 (L) 4.50 - 5.90 x10*6/uL    Hemoglobin 13.8 13.5 - 17.5 g/dL    Hematocrit 42.2 41.0 - 52.0 %    MCV 97 80 - 100 fL    MCH 31.6 26.0 - 34.0 pg    MCHC 32.7 32.0 - 36.0 g/dL    RDW 12.3 11.5 - 14.5 %    Platelets 210 150 - 450 x10*3/uL   Basic Metabolic Panel   Result Value Ref Range    Glucose 116 (H) 74 - 99 mg/dL    Sodium 135 (L) 136 - 145 mmol/L    Potassium 4.2 3.5 - 5.3 mmol/L    Chloride 100 98 - 107 mmol/L    Bicarbonate 26 21 - 32 mmol/L    Anion Gap 13 10 - 20 mmol/L    Urea Nitrogen 11 6 - 23 mg/dL    Creatinine 0.69 0.50 - 1.30 mg/dL    eGFR >90 >60 mL/min/1.73m*2    Calcium 8.8 8.6 - 10.3 mg/dL       Imaging Results  FL less than 1 hour   Final Result      XR chest 1 view   Final Result   Allowing for the aforementioned limitation, unremarkable chest.        Signed by: Kunal Yip 10/1/2024 5:05 PM   Dictation workstation:   [id number]      XR hip left with pelvis when performed 2 or 3 views   Final Result   The left femoral neck fracture described on the CT scan of earlier the   same day is much less conspicuous by plain radiography.   Signed by Matthew Arriola MD      CT hip left wo IV contrast   Final Result   Acute comminuted fracture of the left greater trochanter and left   superolateral femoral neck.   Degenerative change of both hip joints and lower lumbar spine.   Signed by Ray Prescott MD      CT pelvis wo IV contrast   Final Result   Acute comminuted fracture of the left greater trochanter and left   superolateral femoral neck.   Degenerative change of both hip joints and lower lumbar spine.   Signed by Ray Prescott MD          Medications:  [Transfer Hold] atorvastatin, 10 mg, oral, Daily  [Transfer Hold]  enoxaparin, 40 mg, subcutaneous, q24h  [Transfer Hold] escitalopram, 5 mg, oral, Daily  [Transfer Hold] sennosides, 2 tablet, oral, BID  [Transfer Hold] tamsulosin, 0.4 mg, oral, Nightly       PRN medications: [Transfer Hold] acetaminophen **OR** [Transfer Hold] acetaminophen **OR** [Transfer Hold] acetaminophen, droperidol, hydrALAZINE, HYDROmorphone, HYDROmorphone, [Transfer Hold] ondansetron ODT **OR** [Transfer Hold] ondansetron, ondansetron, [Transfer Hold] oxyCODONE, oxyCODONE, oxygen, prochlorperazine, [Transfer Hold] traMADol        Assessment/Plan          Principal Problem:    Closed fracture of greater trochanter of left femur  Active Problems:    Closed nondisplaced fracture of greater trochanter of left femur, initial encounter    L hip fx  -Ortho following  -To OR later today.  -Pain meds   -IVF  -NPO  -PT/OT to see after     HLD  -Atorvastatin     DVT Prophylaxis:  Lovenox subq    Disposition:  OR today, anticipate DC over the weekend     Jean Elaine, [facility] Medicine

## 2024-10-03 NOTE — OP NOTE
Left Hip Fracture Open Reduction Internal Fixation with Trochanteric Nail (L) Operative Note     Date: 10/1/2024 - 10/3/2024  OR Location: [facility] OR    Name: James Viera, : 1950, Age: 74 y.o., MRN: [id number], Sex: male    Diagnosis  Pre-op Diagnosis      * Closed nondisplaced fracture of greater trochanter of left femur, initial encounter [S72.115A] Post-op Diagnosis     * Closed nondisplaced fracture of greater trochanter of left femur, initial encounter [S72.115A]     Procedures  Left Hip Fracture Open Reduction Internal Fixation with Trochanteric Nail  [id number] - ID TX INTER/ID/SUBTRCHNTRIC FEM FX IMED [id number]      Surgeons      * Clifton Alicea - Primary    Resident/Fellow/Other Assistant:  Surgeons and Role:     * Cedrick Daniels MD - Resident - Assisting     * Jorge Chau MD - Resident - Assisting    Procedure Summary  Anesthesia: General  ASA: II  Anesthesia Staff: Anesthesiologist: Mayra Carpenter MD  CRNA: LALIT Orlando-CRNA; LALIT Johnson-CRNA  Estimated Blood Loss: 20 mL  Intra-op Medications:   Administrations occurring from 1100 to 1300 on 10/03/24:   Medication Name Total Dose   sodium chloride 0.9 % irrigation solution 1,000 mL              Anesthesia Record               Intraprocedure I/O Totals          Output    Est. Blood Loss 20 mL    Total Output 20 mL          Specimen: No specimens collected     Staff:   Circulator: Kalpana Rosalesub Person: Shanti         Drains and/or Catheters: * None in log *    Tourniquet Times:         Implants:  Implants       Type Name Action Serial No.      Screw TROCH NAIL, GAM3 125D [id number] W/TI SET SCRW - SN/A - [id number] Implanted N/A     Screw LAG SCREW, GAM3 TI 10.5 X 90MM - SN/A - [id number] Implanted N/A              Findings: Nondisplaced greater troches fracture extending to femoral neck    Indications: James Viera is an 74 y.o. male who is having surgery for Closed nondisplaced fracture of greater trochanter of left femur, initial  encounter [S72.115A].  Nondisplaced left greater trochanter fracture with inferior extension.  Here for surgical stabilization.    The patient was seen in the preoperative area. The risks, benefits, complications, treatment options, non-operative alternatives, expected recovery and outcomes were discussed with the patient. The possibilities of reaction to medication, pulmonary aspiration, injury to surrounding structures, bleeding, recurrent infection, the need for additional procedures, failure to diagnose a condition, and creating a complication requiring transfusion or operation were discussed with the patient. The patient concurred with the proposed plan, giving informed consent.  The site of surgery was properly noted/marked if necessary per policy. The patient has been actively warmed in preoperative area. Preoperative antibiotics have been ordered and given within 1 hours of incision. Venous thrombosis prophylaxis are not indicated.    Procedure Details:   Pre OP DX: Left hip Intertrochanteric fracture  Postop DX: Same  Procedure:  ORIF left hip intertrochanteric fracture with IM nail  Surgeon: Clifton Alicea MD  Asst: Cedrick Daniels MD; Jorge Chau MD  Anesthesia: General  Clinical Note: 74 year-old male admitted to the hospital after mechanical fall at home.  Nondisplaced left hip intertrochanteric fracture.  Cleared by medicine for surgery.  Understood the risk of fracture including bleeding, infection, nonunion, malunion, possible need for further surgery.  Understood these risks and wished to proceed.  Procedure Note: Patient brought to operating room.  Timeout performed.  Antibiotics given IV.  General anesthesia given.  Transferred the fracture table.  All bony prominences padded well.  Nondisplaced fracture confirmed under C-arm images.  Left hip was prepped and draped in typical sterile fashion.  Incision made around the lateral gluteus through the deep fascia.  Guidepin was placed to the greater trochanter  using C-arm imaging.  Pin was advanced to the level lesser trochanter and confirmed in good position.  Opening reamer was advanced.  Short gamma nail was advanced past the fracture into the proximal shaft maintaining good alignment.  Guidepin was advanced up the center of the femoral head neck on AP and lateral images.  Was measured for 90 mm screw.  Femoral neck was drilled and screw was placed with good fixation in the head.  Screw was locked proximally.  Distal interlocking screw was placed through the nail using the jig.  C-arm images confirmed good alignment of the fracture and hardware.  Wounds were irrigated.  Wounds were closed in layers.  Soft compressive dressing applied.  Complications:  None; patient tolerated the procedure well.    Disposition: PACU - hemodynamically stable.  Condition: stable         Additional Details: none    Attending Attestation:     Clifton Alicea  Phone Number: [phone]

## 2024-10-03 NOTE — PROGRESS NOTES
"Orthopaedic Surgery Progress Note    S:    Evaluated post-operatively. Pain controlled on current regimen.  Denies any new onset numbness, tingling or weakness. Denies nausea, vomiting, chest pain, dyspnea, or calf tenderness. Denies any fever or chills.     O:  /78   Pulse 71   Temp 36.8 °C (98.2 °F) (Temporal)   Resp 16   Ht 1.676 m (5' 6\")   Wt 68 kg (149 lb 14.6 oz)   SpO2 94%   BMI 24.20 kg/m²     GEN - NAD, resting comfortably in hospital bed  HEENT - MMM, EOMI, NCAT   CV - RRR by peripheral palpation, limbs wwp  PULM - NWOB on RA  ABD - Non-distended  NEURO - WHITE spontaneously, CNs II - XII grossly intact  PSYCH - Appropriate mood and affect    MSK:  LLE:   -Post-operative dressing/splint in place without strikethrough bleeding.   -Motor intact in DF/PF/EHL/FHL, SILT in saph/sural/SPN/DPN/tibial distributions  -Foot wwp, brisk cap refill, 2+ DP/PT pulse  -Compartments soft and compressible, no pain with passive dorsiflexion      A/P: 74 y.o. male PMH HLD, depression/anxiety, BPH s/p CMN L femur on 10/3 with Dr. Alicea.    Plan:  - Weight bearing: WBAT LLE  - DVT ppx: SCDs, okay for chemo PPx per primary; recommend at least ASA 81 mg BID for 4 weeks post-op  - Diet: Okay for diet from orthopedic perspective  - Perioperative Antibiotics: 24 hour perioperative ancef   - Please send with Calcium (as carbonate)-Vitamin D 600mg-400IU PO BID for 30 days upon discharge   - Drain: None  - Post-operative Imaging: None   - No plans to return to the OR with orthopedic surgery this admission.     This plan was discussed with the attending, Dr. Alicea.    Orthopedic General Team (contact via epic chat):     Dwain Lima, PGY-3   Cedrick Daniels, PGY-5     Please call on call orthopedic NP on nights after 6pm and weekends      "

## 2024-10-03 NOTE — ANESTHESIA PROCEDURE NOTES
Airway  Date/Time: 10/3/2024 10:52 AM  Urgency: elective    Airway not difficult    Staffing  Performed: CRNA   Authorized by: Mayra Carpenter MD    Performed by: LALIT Orlando-DANIELLE  Patient location during procedure: OR    Indications and Patient Condition  Indications for airway management: anesthesia and airway protection  Spontaneous ventilation: present  Sedation level: deep  Preoxygenated: yes  Patient position: sniffing  MILS maintained throughout  Mask difficulty assessment: 1 - vent by mask    Final Airway Details  Final airway type: endotracheal airway      Successful airway: ETT  Cuffed: yes   Successful intubation technique: direct laryngoscopy  Blade: Vera  Blade size: #3  ETT size (mm): 7.5  Cormack-Lehane Classification: grade I - full view of glottis  Placement verified by: chest auscultation and capnometry   Measured from: lips  ETT to lips (cm): 22  Number of attempts at approach: 1

## 2024-10-03 NOTE — CARE PLAN
The patient's goals for the shift include      The clinical goals for the shift include control pain throughout shift      Problem: Pain - Adult  Goal: Verbalizes/displays adequate comfort level or baseline comfort level  Outcome: Progressing     Problem: Safety - Adult  Goal: Free from fall injury  Outcome: Progressing     Problem: Discharge Planning  Goal: Discharge to home or other facility with appropriate resources  Outcome: Progressing     Problem: Chronic Conditions and Co-morbidities  Goal: Patient's chronic conditions and co-morbidity symptoms are monitored and maintained or improved  Outcome: Progressing     Problem: Pain  Goal: Takes deep breaths with improved pain control throughout the shift  Outcome: Progressing  Goal: Turns in bed with improved pain control throughout the shift  Outcome: Progressing  Goal: Walks with improved pain control throughout the shift  Outcome: Progressing  Goal: Performs ADL's with improved pain control throughout shift  Outcome: Progressing  Goal: Participates in PT with improved pain control throughout the shift  Outcome: Progressing  Goal: Free from opioid side effects throughout the shift  Outcome: Progressing  Goal: Free from acute confusion related to pain meds throughout the shift  Outcome: Progressing     Problem: Fall/Injury  Goal: Not fall by end of shift  Outcome: Progressing  Goal: Be free from injury by end of the shift  Outcome: Progressing  Goal: Verbalize understanding of personal risk factors for fall in the hospital  Outcome: Progressing  Goal: Verbalize understanding of risk factor reduction measures to prevent injury from fall in the home  Outcome: Progressing  Goal: Use assistive devices by end of the shift  Outcome: Progressing  Goal: Pace activities to prevent fatigue by end of the shift  Outcome: Progressing

## 2024-10-04 ENCOUNTER — DOCUMENTATION (OUTPATIENT)
Dept: HOME HEALTH SERVICES | Facility: HOME HEALTH | Age: 74
End: 2024-10-04
Payer: MEDICARE

## 2024-10-04 ENCOUNTER — HOME HEALTH ADMISSION (OUTPATIENT)
Dept: HOME HEALTH SERVICES | Facility: HOME HEALTH | Age: 74
End: 2024-10-04
Payer: MEDICARE

## 2024-10-04 VITALS
WEIGHT: 149.91 LBS | HEIGHT: 66 IN | DIASTOLIC BLOOD PRESSURE: 81 MMHG | OXYGEN SATURATION: 96 % | SYSTOLIC BLOOD PRESSURE: 139 MMHG | HEART RATE: 54 BPM | RESPIRATION RATE: 18 BRPM | TEMPERATURE: 98 F | BODY MASS INDEX: 24.09 KG/M2

## 2024-10-04 LAB
ANION GAP SERPL CALC-SCNC: 9 MMOL/L (ref 10–20)
BUN SERPL-MCNC: 11 MG/DL (ref 6–23)
CALCIUM SERPL-MCNC: 8.3 MG/DL (ref 8.6–10.3)
CHLORIDE SERPL-SCNC: 98 MMOL/L (ref 98–107)
CO2 SERPL-SCNC: 31 MMOL/L (ref 21–32)
CREAT SERPL-MCNC: 0.66 MG/DL (ref 0.5–1.3)
EGFRCR SERPLBLD CKD-EPI 2021: >90 ML/MIN/1.73M*2
ERYTHROCYTE [DISTWIDTH] IN BLOOD BY AUTOMATED COUNT: 12.1 % (ref 11.5–14.5)
GLUCOSE SERPL-MCNC: 126 MG/DL (ref 74–99)
HCT VFR BLD AUTO: 34.9 % (ref 41–52)
HGB BLD-MCNC: 11.3 G/DL (ref 13.5–17.5)
MCH RBC QN AUTO: 31.7 PG (ref 26–34)
MCHC RBC AUTO-ENTMCNC: 32.4 G/DL (ref 32–36)
MCV RBC AUTO: 98 FL (ref 80–100)
NRBC BLD-RTO: 0 /100 WBCS (ref 0–0)
PLATELET # BLD AUTO: 196 X10*3/UL (ref 150–450)
POTASSIUM SERPL-SCNC: 4.1 MMOL/L (ref 3.5–5.3)
RBC # BLD AUTO: 3.57 X10*6/UL (ref 4.5–5.9)
SODIUM SERPL-SCNC: 134 MMOL/L (ref 136–145)
WBC # BLD AUTO: 11.6 X10*3/UL (ref 4.4–11.3)

## 2024-10-04 PROCEDURE — 97110 THERAPEUTIC EXERCISES: CPT | Mod: GP | Performed by: PHYSICAL THERAPIST

## 2024-10-04 PROCEDURE — 97535 SELF CARE MNGMENT TRAINING: CPT | Mod: GO

## 2024-10-04 PROCEDURE — 2500000001 HC RX 250 WO HCPCS SELF ADMINISTERED DRUGS (ALT 637 FOR MEDICARE OP): Performed by: STUDENT IN AN ORGANIZED HEALTH CARE EDUCATION/TRAINING PROGRAM

## 2024-10-04 PROCEDURE — 2500000004 HC RX 250 GENERAL PHARMACY W/ HCPCS (ALT 636 FOR OP/ED): Mod: JZ | Performed by: STUDENT IN AN ORGANIZED HEALTH CARE EDUCATION/TRAINING PROGRAM

## 2024-10-04 PROCEDURE — 85027 COMPLETE CBC AUTOMATED: CPT | Performed by: STUDENT IN AN ORGANIZED HEALTH CARE EDUCATION/TRAINING PROGRAM

## 2024-10-04 PROCEDURE — 9420000001 HC RT PATIENT EDUCATION 5 MIN

## 2024-10-04 PROCEDURE — 80048 BASIC METABOLIC PNL TOTAL CA: CPT | Performed by: STUDENT IN AN ORGANIZED HEALTH CARE EDUCATION/TRAINING PROGRAM

## 2024-10-04 PROCEDURE — 97161 PT EVAL LOW COMPLEX 20 MIN: CPT | Mod: GP | Performed by: PHYSICAL THERAPIST

## 2024-10-04 PROCEDURE — 97116 GAIT TRAINING THERAPY: CPT | Mod: GP | Performed by: PHYSICAL THERAPIST

## 2024-10-04 PROCEDURE — 97530 THERAPEUTIC ACTIVITIES: CPT | Mod: GP | Performed by: PHYSICAL THERAPIST

## 2024-10-04 PROCEDURE — 99238 HOSP IP/OBS DSCHRG MGMT 30/<: CPT | Performed by: INTERNAL MEDICINE

## 2024-10-04 PROCEDURE — 36415 COLL VENOUS BLD VENIPUNCTURE: CPT | Performed by: STUDENT IN AN ORGANIZED HEALTH CARE EDUCATION/TRAINING PROGRAM

## 2024-10-04 PROCEDURE — 97165 OT EVAL LOW COMPLEX 30 MIN: CPT | Mod: GO

## 2024-10-04 RX ORDER — AMOXICILLIN 250 MG
2 CAPSULE ORAL 2 TIMES DAILY
Qty: 56 TABLET | Refills: 0 | Status: SHIPPED | OUTPATIENT
Start: 2024-10-04 | End: 2024-10-18

## 2024-10-04 RX ORDER — ASPIRIN 81 MG/1
81 TABLET ORAL 2 TIMES DAILY
Qty: 60 TABLET | Refills: 0 | Status: SHIPPED | OUTPATIENT
Start: 2024-10-04 | End: 2024-11-03

## 2024-10-04 RX ORDER — OXYCODONE HYDROCHLORIDE 5 MG/1
5 TABLET ORAL EVERY 6 HOURS PRN
Qty: 15 TABLET | Refills: 0 | Status: SHIPPED | OUTPATIENT
Start: 2024-10-04

## 2024-10-04 ASSESSMENT — PAIN SCALES - GENERAL
PAINLEVEL_OUTOF10: 1
PAINLEVEL_OUTOF10: 0 - NO PAIN
PAINLEVEL_OUTOF10: 5 - MODERATE PAIN

## 2024-10-04 ASSESSMENT — COGNITIVE AND FUNCTIONAL STATUS - GENERAL
MOBILITY SCORE: 19
HELP NEEDED FOR BATHING: A LITTLE
CLIMB 3 TO 5 STEPS WITH RAILING: A LITTLE
MOVING TO AND FROM BED TO CHAIR: A LITTLE
MOBILITY SCORE: 21
DAILY ACTIVITIY SCORE: 22
STANDING UP FROM CHAIR USING ARMS: A LITTLE
MOVING TO AND FROM BED TO CHAIR: A LITTLE
WALKING IN HOSPITAL ROOM: A LITTLE
DRESSING REGULAR LOWER BODY CLOTHING: A LITTLE
DAILY ACTIVITIY SCORE: 24
CLIMB 3 TO 5 STEPS WITH RAILING: A LITTLE
TURNING FROM BACK TO SIDE WHILE IN FLAT BAD: A LITTLE
WALKING IN HOSPITAL ROOM: A LITTLE

## 2024-10-04 ASSESSMENT — PAIN - FUNCTIONAL ASSESSMENT
PAIN_FUNCTIONAL_ASSESSMENT: 0-10

## 2024-10-04 ASSESSMENT — PAIN DESCRIPTION - LOCATION: LOCATION: HIP

## 2024-10-04 ASSESSMENT — PAIN DESCRIPTION - ORIENTATION: ORIENTATION: LEFT

## 2024-10-04 ASSESSMENT — ACTIVITIES OF DAILY LIVING (ADL): HOME_MANAGEMENT_TIME_ENTRY: 10

## 2024-10-04 NOTE — PROGRESS NOTES
10/04/24 1014   Discharge Planning   Who is requesting discharge planning? Provider   Home or Post Acute Services In home services   Type of Home Care Services Home OT;Home PT   Expected Discharge Disposition Home H   Does the patient need discharge transport arranged? No     10/4/24 1014  PT/OT rec low.  Spoke with patient and he would like to go home with HHC. Explained HHC options and expectations.  He would like to use [facility].  He will also need a rolling walker for discharge.  Dr Elaine aware.  ADOD today or tomorrow.  Nandini Liriano RN TCC    10/4/24 1240  [facility] confirmed SOC for 10/7/24.  Patient and wife updated.  Nandini Liriano RN TCC

## 2024-10-04 NOTE — PROGRESS NOTES
Physical Therapy    Physical Therapy Evaluation & Treatment    Patient Name: James Viera  MRN: [id number]  Department: [place] [address]  Room: [address] [place] [address]  Today's Date: 10/4/2024   Time Calculation  Start Time: 0850  Stop Time: 0937  Time Calculation (min): 47 min  Completion of this session and documentation performed under the supervision of Katie Leslie PT.    Assessment/Plan   PT Assessment  PT Assessment Results: Decreased strength, Decreased range of motion, Decreased mobility, Impaired balance  Rehab Prognosis: Good  Evaluation/Treatment Tolerance: Patient tolerated treatment well  Medical Staff Made Aware: Yes  Strengths: Ability to acquire knowledge, Premorbid level of function, Support and attitude of living partners, Physical health  End of Session Communication: Bedside nurse  Assessment Comment: Pt is a 75 yo M presenting with decreased independence with bed mobility, transfers and ambulation following L hip ORIF. Pt will benefit from skilled PT to assist with discharge planning and address the above limitations to allow pt to return to prior level of function, which was independent with no assistive device.  End of Session Patient Position: Bed, 2 rail up, Alarm on   IP OR SWING BED PT PLAN  Inpatient or Swing Bed: Inpatient  PT Plan  Treatment/Interventions: Bed mobility, Transfer training, Gait training, Stair training, Balance training, Strengthening, Therapeutic exercise, Home exercise program, Endurance training  PT Plan: Ongoing PT  PT Frequency: Daily  PT Discharge Recommendations: Low intensity level of continued care  PT - OK to Discharge: Yes (Per PT POC)    Subjective     General Visit Information:  General  Reason for Referral: POD1 L hip ORIF  Referred By: [place]  Past Medical History Relevant to Rehab:   Past Medical History:   Diagnosis Date    Anxiety disorder 10/01/2024    BPH (benign prostatic hyperplasia) 07/27/2005    Hyperlipidemia 10/01/2024     Past Surgical History:   Procedure  Laterality Date    TONSILLECTOMY  05/11/2015    Tonsillectomy     Prior to Session Communication: Bedside nurse  Patient Position Received: Up in chair, Alarm on  Preferred Learning Style: auditory, kinesthetic, verbal, visual  General Comment: Pt is very pleasant and open to a PT session. Looking forward to moving around more as he is active at baseline.    Home Living:  Home Living  Type of Home: House  Lives With: Spouse  Home Adaptive Equipment: Crutches  Home Layout: Multi-level  Home Access: Stairs to enter with rails  Entrance Stairs-Rails: Right  Entrance Stairs-Number of Steps: 2  Bathroom Shower/Tub: Walk-in shower  Bathroom Toilet: Standard, Handicapped height  Bathroom Equipment: Built-in shower seat, Raised toilet seat without rails  Home Living Comments: Pt lives in a multi level home with his wife. 2 ONELIA with a R handrail. 10 steps in the home with L rail to get to his bedroom on second floor. Pt states he has 3 steps down to a base level that has a bathroom and aj bed he could stay on if he needs to.  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 Active at the gym multiple times a week with a . Likes to drive his old sports car.  Prior Function Comments: Pt reports using his crutches at home after he fell on 9/29/24, prior to coming to hospital.  Precautions:  Precautions  LE Weight Bearing Status: Weight Bearing as Tolerated  Medical Precautions: Fall precautions    Objective   Pain:  Pain Assessment  Pain Assessment: 0-10  0-10 (Numeric) Pain Score: 1  Cognition:  Cognition  Orientation Level: Oriented X4    General Assessments:  General Observation  General Observation: Pt is young and healthy,  friendly gentleman who is very active and engaged in his care. Eager to return to his PLOF and he knows the best way to do this is to get movement started.     Activity Tolerance  Endurance: Endurance does not limit participation in activity     Static Sitting Balance  Static Sitting-Balance Support: Bilateral upper extremity supported  Static Sitting-Level of Assistance: Independent  Dynamic Sitting Balance  Dynamic Sitting-Balance Support: Bilateral upper extremity supported  Dynamic Sitting-Level of Assistance: Distant supervision    Static Standing Balance  Static Standing-Balance Support: Bilateral upper extremity supported  Static Standing-Level of Assistance: Contact guard  Dynamic Standing Balance  Dynamic Standing-Balance Support: Bilateral upper extremity supported  Dynamic Standing-Level of Assistance: Contact guard  Functional Assessments:  Bed Mobility  Bed Mobility: Yes  Bed Mobility 1  Bed Mobility 1: Sitting to supine  Level of Assistance 1: Minimum assistance  Bed Mobility Comments 1: Pt had difficulty getting into position in a painfree way; needed Yoly to swing surgical leg onto bed comfortably.    Transfers  Transfer: Yes  Transfer 1  Transfer From 1: Sit to, Stand to  Transfer to 1: Stand, Sit  Technique 1: Sit to stand, Stand to sit  Transfer Device 1: Walker  Transfer Level of Assistance 1: Contact guard  Trials/Comments 1: Pt can transfer without assistance. CGA for safety. Min cues for hand placement prior to standing and prior to sitting as well as having legs against bed before sitting down, and avoid falling into chair.    Extremity/Trunk Assessments:  RLE   RLE : Within Functional Limits  LLE   LLE : Exceptions to WFL (Grossly 3/5, NT due to hip surgery)  Treatments:    Therapeutic Exercise  Pt provided written HEP with hip fracture exercises, set to 10-20 reps 2-3 times a day. Pt performed 1x10 bilateral supine glute sets and ankle pumps, and L quad sets, hip abd, heel slides, and SAQ.  Educated on seated LAQ.     Therapeutic Activity  Verbal education provided to patient for bringing seat back and reclining headrest prior to entry of car as well as placing a plastic bag on the seat for ease of turning.  Pt verbalized understanding.    Ambulation/Gait Training  Ambulation/Gait Training Performed: Yes  Ambulation/Gait Training 1  Surface 1: Level tile  Device 1: Rolling walker  Gait Support Devices: Gait belt  Assistance 1: Contact guard  Quality of Gait 1: Decreased step length (Decreased gait speed)  Comments/Distance (ft) 1: Distance: 200 ft x2 from room to rehab stairs, 20ft x 2 around room. Education on ambulation with walker and step-to sequencing. Pt progressed to trying step through gait but noted more difficulty and some pain increase, so stayed consistent with step-to gait today.    Stairs  Stairs: Yes  Stairs  Rails 1: Bilateral, Left  Curb Step 1: No  Device 1: Railing, Axillary crutches (One crutch on side opposite of railing)  Support Devices 1: Gait belt  Assistance 1: Contact guard  Comment/Number of Steps 1: 4 steps x3 trials; one trial with B handrails, two trials with L rail and one crutch. Pt was provided moderate cueing on stair navigation with surgical leg going up and going down, as well as sequencing of steps with the crutch.  Outcome Measures:  [facility] Basic Mobility  Turning from your back to your side while in a flat bed without using bedrails: None  Moving from lying on your back to sitting on the side of a flat bed without using bedrails: A little  Moving to and from bed to chair (including a wheelchair): A little  Standing up from a chair using your arms (e.g. wheelchair or bedside chair): A little  To walk in hospital room: A little  Climbing 3-5 steps with railing: A little  Basic Mobility - Total Score: 19    Encounter Problems       Encounter Problems (Active)       Balance       STG - Maintains dynamic standing balance with upper extremity support with RW mod-I.        Start:  10/04/24    Expected End:  10/11/24       INTERVENTIONS:  1. Practice standing with minimal support.  2. Educate patient about standing tolerance.  3. Educate patient about independence with gait, transfers, and ADL's.  4. Educate patient about use of assistive device.  5. Educate patient about self-directed care.            Mobility       LTG - Patient will navigate 10 steps with a L rail and single crutch mod-I.       Start:  10/04/24    Expected End:  10/11/24            STG - Patient will ambulate 500 ft with RW mod-I.       Start:  10/04/24    Expected End:  10/11/24            Patient will be independent with HEP.       Start:  10/04/24    Expected End:  10/11/24               PT Transfers       LTG - Patient will transfer to car with close supervision with RW.       Start:  10/04/24    Expected End:  10/11/24            STG - Patient to transfer to and from sit to supine with close supervision.       Start:  10/04/24    Expected End:  10/11/24            STG - Patient will transfer sit to and from stand with RW modified independently.       Start:  10/04/24    Expected End:  10/11/24               Pain - Adult             Encounter Problems (Resolved)       Pain - Adult              Education Documentation  Handouts, taught by JOHN Cortes at 10/4/2024 10:16 AM.  Learner: Patient  Readiness: Acceptance  Method: Explanation, Demonstration, Handout  Response: Verbalizes Understanding, Demonstrated Understanding    Precautions, taught by JOHN Cortes at 10/4/2024 10:16 AM.  Learner: Patient  Readiness: Acceptance  Method: Explanation, Demonstration, Handout  Response: Verbalizes Understanding, Demonstrated Understanding    Body Mechanics, taught by JOHN Cortes at 10/4/2024 10:16 AM.  Learner: Patient  Readiness: Acceptance  Method: Explanation, Demonstration, Handout  Response: Verbalizes Understanding, Demonstrated Understanding    Home Exercise Program, taught by  JOHN Cortes at 10/4/2024 10:16 AM.  Learner: Patient  Readiness: Acceptance  Method: Explanation, Demonstration, Handout  Response: Verbalizes Understanding, Demonstrated Understanding    Mobility Training, taught by JOHN Cortes at 10/4/2024 10:16 AM.  Learner: Patient  Readiness: Acceptance  Method: Explanation, Demonstration, Handout  Response: Verbalizes Understanding, Demonstrated Understanding    Education Comments  No comments found.

## 2024-10-04 NOTE — PROGRESS NOTES
Occupational Therapy    Evaluation/Treatment    Patient Name: James Viera  MRN: [id number]  Department: [facility] 7  Room: [facility]-A  Today's Date: 10/04/24  Time Calculation  Start Time: 0940  Stop Time: 1004  Time Calculation (min): 24 min       Assessment:  OT Assessment: 75 yo presenting s/p L ORIF, able to manage ADLs/mobility at independnet/mod I level. Educated pt and spouse regarding where to obtain shower chair and hip kit. No additional acute OT needs. Will dc at this time.  Prognosis: Excellent  Barriers to Discharge: None  Evaluation/Treatment Tolerance: Patient tolerated treatment well  Medical Staff Made Aware: Yes  End of Session Communication: Bedside nurse  End of Session Patient Position: Up in chair, Alarm off, not on at start of session (RN okay with chair alarm off)  OT Assessment Results: Decreased ADL status, Decreased IADLs  Prognosis: Excellent  Barriers to Discharge: None  Evaluation/Treatment Tolerance: Patient tolerated treatment well  Medical Staff Made Aware: Yes  Plan:  Treatment Interventions: ADL retraining  OT Frequency: OT eval only  Equipment Recommended upon Discharge:  (shower chair, hip kit)  OT Recommended Transfer Status: Stand by assist  OT - OK to Discharge: Yes  Treatment Interventions: ADL retraining    Subjective   Current Problem:  1. Closed nondisplaced fracture of greater trochanter of left femur, initial encounter  FL less than 1 hour    FL less than 1 hour    aspirin 81 mg EC tablet    oxyCODONE (Roxicodone) 5 mg immediate release tablet    sennosides-docusate sodium (Shaylee-Colace) 8.6-50 mg tablet    Wheeled Folding Walker    Referral to Home Health    CANCELED: Case Request Operating Room: Hip Fracture ORIF w/ Nail Trochanteric    CANCELED: Case Request Operating Room: Hip Fracture ORIF w/ Nail Trochanteric        General:   OT Received On: 10/04/24  General  Reason for Referral: decline in ADls; 75 yo presented to ED after recent accidental fall (+slipped on wet  matilda) POD#1 L hip ORIF  Referred By: [place]  Past Medical History Relevant to Rehab:   Past Medical History:   Diagnosis Date    Anxiety disorder 10/01/2024    BPH (benign prostatic hyperplasia) 07/27/2005    Hyperlipidemia 10/01/2024     Prior to Session Communication: Bedside nurse  Patient Position Received: Bed, 3 rail up, Alarm off, not on at start of session  Preferred Learning Style: auditory, kinesthetic, verbal, visual  General Comment: Pt is very pleasant eager to work with therapy.  Precautions:  LE Weight Bearing Status: Weight Bearing as Tolerated  Medical Precautions: Fall precautions      Pain:  Pain Assessment  Pain Assessment: 0-10  0-10 (Numeric) Pain Score: 0 - No pain    Objective   Cognition:  Overall Cognitive Status: Within Functional Limits  Orientation Level: Oriented X4  Attention: Within Functional Limits  Memory: Within Funtional Limits  Problem Solving: Within Functional Limits  Safety/Judgement: Within Functional Limits  Insight: Within function limits           Home Living:  Type of Home: House  Lives With: Spouse  Home Adaptive Equipment: Crutches  Home Layout: Multi-level  Home Access: Stairs to enter with rails  Entrance Stairs-Rails: Right  Bathroom Shower/Tub: Walk-in shower  Bathroom Toilet: Standard, Handicapped height  Bathroom Equipment: Built-in shower seat, Raised toilet seat without rails  Prior Function:  Level of [place]: Independent with ADLs and functional transfers, Independent with homemaking with ambulation  Ambulatory Assistance: Independent (No AD at baseline)  Vocational: Full time employment  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 Active at the gym multiple times a week with a . Likes to drive his old sports car.  Prior Function Comments: Pt reports using his crutches at home after he fell on 9/29/24, prior to coming to hospital.  IADL History:  Homemaking Responsibilities: Yes  IADL Comments: pt +working and very active;  also is a baseball umpire  ADL:  LE Dressing Assistance: Modified independent (Device)  LE Dressing Deficit: Verbal cueing, Thread RLE into underwear, Thread LLE into underwear, Don/doff R sock, Don/doff L sock, Thread RLE into pants, Thread LLE into pants, Pull up over hips, Use of adaptive equipment, Increased time to complete (educated on use of sock aide and reacher to manage LB Dressing tasks  as needed; pt informed of where pt purchase)    Activity Tolerance:  Endurance: Endurance does not limit participation in activity       Bed Mobility/Transfers: Bed Mobility  Bed Mobility: Yes  Bed Mobility 1  Bed Mobility 1: Supine to sitting  Level of Assistance 1: Independent  Bed Mobility Comments 1: bed flat and no use of bed rails to simulate home going    Transfers  Transfer: Yes  Transfer 1  Transfer From 1: Sit to, Stand to  Transfer to 1: Stand, Sit  Technique 1: Sit to stand, Stand to sit  Transfer Device 1: Walker  Transfer Level of Assistance 1: Modified independent, Minimal verbal cues  Trials/Comments 1: min verbal cues for hand placement; excellent eccentric lowering      Functional Mobility:     Sitting Balance:  Static Sitting Balance  Static Sitting-Level of Assistance: Independent  Dynamic Sitting Balance  Dynamic Sitting-Level of Assistance: Independent  Standing Balance:  Static Standing Balance  Static Standing-Level of Assistance: Modified Independent  Dynamic Standing Balance  Dynamic Standing-Level of Assistance: Modified independent    Sensation:  Light Touch: No apparent deficits  Strength:  Strength Comments: WNLs    Coordination:  Movements are Fluid and Coordinated: Yes   Hand Function:  Hand Function  Gross Grasp: Functional  Coordination: Functional  Extremities: RUE   RUE : Within Functional Limits and LUE   LUE: Within Functional Limits    Outcome Measures: [facility] Daily Activity  Putting on and taking off regular lower body clothing: A little  Bathing (including washing, rinsing, drying):  A little  Putting on and taking off regular upper body clothing: None  Toileting, which includes using toilet, bedpan or urinal: None  Taking care of personal grooming such as brushing teeth: None  Eating Meals: None  Daily Activity - Total Score: 22      Education Documentation  Body Mechanics, taught by Jaime Pyle OT at 10/4/2024 11:22 AM.  Learner: Family, Patient  Readiness: Acceptance  Method: Explanation  Response: Verbalizes Understanding    Precautions, taught by Jaime Pyle OT at 10/4/2024 11:22 AM.  Learner: Family, Patient  Readiness: Acceptance  Method: Explanation  Response: Verbalizes Understanding    ADL Training, taught by Jaime Pyle OT at 10/4/2024 11:22 AM.  Learner: Family, Patient  Readiness: Acceptance  Method: Explanation  Response: Verbalizes Understanding    Education Comments  No comments found.

## 2024-10-04 NOTE — CARE PLAN
The patient's goals for the shift include      The clinical goals for the shift include control pain throughout shift    Over the shift, the patient did make progress toward the goal.

## 2024-10-04 NOTE — PROGRESS NOTES
"Orthopaedic Surgery Progress Note    S:    Evaluated post-operatively on RNF. OOB to bathroom. Pain controlled on current regimen.  Denies any new onset numbness, tingling or weakness. Denies nausea, vomiting, chest pain, dyspnea, or calf tenderness. Denies any fever or chills. Tolerating PO.     O:  /57 (BP Location: Right arm, Patient Position: Lying)   Pulse 60   Temp 36.1 °C (97 °F) (Temporal)   Resp 17   Ht 1.676 m (5' 6\")   Wt 68 kg (149 lb 14.6 oz)   SpO2 94%   BMI 24.20 kg/m²     GEN - NAD, resting comfortably in hospital bed  HEENT - MMM, EOMI, NCAT   CV - RRR by peripheral palpation, limbs wwp  PULM - NWOB on RA  ABD - Non-distended  NEURO - WHITE spontaneously, CNs II - XII grossly intact  PSYCH - Appropriate mood and affect    MSK:  LLE:   -Post-operative dressing/splint in place without strikethrough bleeding.   -Motor intact in DF/PF/EHL/FHL, SILT in saph/sural/SPN/DPN/tibial distributions  -Foot wwp, brisk cap refill, 2+ DP/PT pulse  -Compartments soft and compressible, no pain with passive dorsiflexion      A/P: 74 y.o. male PMH HLD, depression/anxiety, BPH s/p CMN L femur on 10/3 with Dr. Alicea.    Plan:  - Weight bearing: WBAT LLE  - DVT ppx: SCDs, okay for chemo PPx per primary; recommend at least ASA 81 mg BID for 4 weeks post-op  - Diet: Okay for diet from orthopedic perspective  - Perioperative Antibiotics: 24 hour perioperative ancef   - Please send with Calcium (as carbonate)-Vitamin D 600mg-400IU PO BID for 30 days upon discharge   - Drain: None  - Post-operative Imaging: None   - No plans to return to the OR with orthopedic surgery this admission.   - PT/OT  - Orthopedic surgery to follow peripherally while patient is admitted    This plan was discussed with the attending, Dr. Alicea.    Orthopedic General Team (contact via epic chat):     Dwain Lima, PGY-3   Cedrick Daniels, PGY-5     Please call on call orthopedic NP on nights after 6pm and weekends      "

## 2024-10-04 NOTE — NURSING NOTE
Interdisciplinary team present: NP, PT, NM, CC, SW, Orthopedic Coordinator, and bedside RN.  Pain - controlled  Nausea - none  Discharge barrier - needs PT eval and medical clearance  Discharge plan - Home?  Discharge date/time - pending PT

## 2024-10-04 NOTE — PROGRESS NOTES
Physical Therapy    Physical Therapy Treatment    Patient Name: James Viera  MRN: [id number]  Department: [place] A 7  Room: [facility]  Today's Date: 10/4/2024  Time Calculation  Start Time: 1119  Stop Time: 1127  Time Calculation (min): 8 min  Completion of this session and documentation performed under the supervision of Katie Leslie PT.    PT Plan  Treatments/Interventions: Bed mobility, transfer training, gait training, stair training, strengthening, range of motion  Plan: Ongoing PT  Frequency: Daily  Discharge Recommendations: Low intensity level of care  Equipment recommended upon discharge: WW  PT recommended upon discharge: stand by assist  Ok to discharge per PT POC     General Visit Information:   PT  Visit  PT Received On: 10/04/24  Response to Previous Treatment: Patient with no complaints from previous session.  General  Reason for Referral: POD1 L hip ORIF  Referred By: [place]  Past Medical History Relevant to Rehab:   Past Medical History:   Diagnosis Date    Anxiety disorder 10/01/2024    BPH (benign prostatic hyperplasia) 07/27/2005    Hyperlipidemia 10/01/2024     Past Surgical History:   Procedure Laterality Date    TONSILLECTOMY  05/11/2015    Tonsillectomy     Family/Caregiver Present: Yes  Caregiver Feedback:  (Pt wife present with questions about stairs, car transfers, and PT plan following discharge from hospital.)  Prior to Session Communication: Bedside nurse  Patient Position Received: Up in chair, Alarm on  Preferred Learning Style: auditory, kinesthetic, verbal, visual  General Comment:  (PT arrived to pt room to issue RW for discharge. Pt signed and acknowledged form. Pt wife was present with questions.)    Subjective   Precautions:  Precautions  LE Weight Bearing Status: Weight Bearing as Tolerated  Medical Precautions: Fall precautions    Treatments:  Therapeutic Activity  Therapeutic Activity Performed: Yes  Therapeutic Activity 1: Reeducated pt and educated pt spouse on proper car  transfer technique, step-to stair climbing technique with rail and crutch, as well as adjusting crutch at home to appropriate height. Adjusted walker to pt appropriate height and tested it out with patient in standing.    Outcome Measures:  [facility] Basic Mobility  Turning from your back to your side while in a flat bed without using bedrails: None  Moving from lying on your back to sitting on the side of a flat bed without using bedrails: A little  Moving to and from bed to chair (including a wheelchair): A little  Standing up from a chair using your arms (e.g. wheelchair or bedside chair): A little  To walk in hospital room: A little  Climbing 3-5 steps with railing: A little  Basic Mobility - Total Score: 19    Encounter Problems       Encounter Problems (Active)       Balance       STG - Maintains dynamic standing balance with upper extremity support with RW mod-I.       Start:  10/04/24    Expected End:  10/11/24       INTERVENTIONS:  1. Practice standing with minimal support.  2. Educate patient about standing tolerance.  3. Educate patient about independence with gait, transfers, and ADL's.  4. Educate patient about use of assistive device.  5. Educate patient about self-directed care.            Mobility       LTG - Patient will navigate 10 steps with a L rail and single crutch mod-I.       Start:  10/04/24    Expected End:  10/11/24            STG - Patient will ambulate 500 ft with RW mod-I.       Start:  10/04/24    Expected End:  10/11/24            Patient will be independent with HEP.       Start:  10/04/24    Expected End:  10/11/24               PT Transfers       LTG - Patient will transfer to car with close supervision with RW.       Start:  10/04/24    Expected End:  10/11/24            STG - Patient to transfer to and from sit to supine with close supervision.       Start:  10/04/24    Expected End:  10/11/24            STG - Patient will transfer sit to and from stand with RW modified  independently.       Start:  10/04/24    Expected End:  10/11/24

## 2024-10-04 NOTE — ANESTHESIA POSTPROCEDURE EVALUATION
Patient: James Viera    Procedure Summary       Date: 10/03/24 Room / Location: [facility] OR 17 / Virtual [facility] OR    Anesthesia Start: 1045 Anesthesia Stop: 1200    Procedure: Left Hip Fracture Open Reduction Internal Fixation with Trochanteric Nail (Left: Hip) Diagnosis:       Closed nondisplaced fracture of greater trochanter of left femur, initial encounter      (Closed nondisplaced fracture of greater trochanter of left femur, initial encounter [S72.115A])    Surgeons: Clifton Alicea MD Responsible Provider: Mayra Carpenter MD    Anesthesia Type: general ASA Status: 2            Anesthesia Type: general    Vitals Value Taken Time   /77 10/03/24 1300   Temp 37 °C (98.6 °F) 10/03/24 1300   Pulse 59 10/03/24 1300   Resp 13 10/03/24 1300   SpO2 95 % 10/03/24 1300       Anesthesia Post Evaluation    Patient location during evaluation: PACU  Patient participation: complete - patient participated  Level of consciousness: awake and alert  Pain management: adequate  Multimodal analgesia pain management approach  Airway patency: patent  Cardiovascular status: hemodynamically stable  Respiratory status: spontaneous ventilation, nonlabored ventilation and unassisted  Hydration status: euvolemic  Postoperative Nausea and Vomiting: none        No notable events documented.

## 2024-10-04 NOTE — HH CARE COORDINATION
Home Care received a Referral for Physical Therapy and Occupational Therapy. We have processed the referral for a Start of Care on 24-72 HOURS .     If you have any questions or concerns, please feel free to contact us at [phone]. Follow the prompts, enter your five digit zip code, and you will be directed to your care team on [facility] 1.

## 2024-10-05 NOTE — DISCHARGE SUMMARY
Discharge Diagnosis  Closed fracture of greater trochanter of left femur    Issues Requiring Follow-Up  Orthopedics follow-up  Primary care follow-up    Discharge Meds     Your medication list        START taking these medications        Instructions Last Dose Given Next Dose Due   aspirin 81 mg EC tablet      Take 1 tablet (81 mg) by mouth 2 times a day.       oxyCODONE 5 mg immediate release tablet  Commonly known as: Roxicodone      Take 1 tablet (5 mg) by mouth every 6 hours if needed for severe pain (7 - 10).       sennosides-docusate sodium 8.6-50 mg tablet  Commonly known as: Shaylee-Colace      Take 2 tablets by mouth 2 times a day for 14 days.              CONTINUE taking these medications        Instructions Last Dose Given Next Dose Due   tamsulosin 0.4 mg 24 hr capsule  Commonly known as: Flomax           escitalopram 5 mg tablet  Commonly known as: Lexapro           atorvastatin 10 mg tablet  Commonly known as: Lipitor                     Where to Get Your Medications        [id number] - [place] [address] [facility]  [address] [facility] [address]      Phone: [phone]   aspirin 81 mg EC tablet  oxyCODONE 5 mg immediate release tablet  sennosides-docusate sodium 8.6-50 mg tablet         Test Results Pending At Discharge  Pending Labs       No current pending labs.            Procedures  Procedure(s):  Left Hip Fracture Open Reduction Internal Fixation with Trochanteric Nail     Hospital Course   James was admitted to hospital after a left hip fracture.  He was evaluated by the orthopedic team and taken for a left hip open reduction internal fixation with trochanteric nail.  He tolerated procedure well.  He was evaluated by PT and OT and was doing extremely well.  He will be going home with home health care for PT and OT.  At this time he is otherwise hemodynamically stable appropriate for  "discharge.  This plan was discussed with him and he is in agreement.  All questions were answered.    Blood pressure 139/81, pulse 54, temperature 36.7 °C (98 °F), temperature source Oral, resp. rate 18, height 1.676 m (5' 6\"), weight 68 kg (149 lb 14.6 oz), SpO2 96%.  Pertinent Physical Exam At Time of Discharge  Physical Exam  Vitals and nursing note reviewed.   Constitutional:       General: He is not in acute distress.     Appearance: Normal appearance. He is not ill-appearing.   HENT:      Head: Normocephalic and atraumatic.      Mouth/Throat:      Mouth: Mucous membranes are moist.      Pharynx: Oropharynx is clear.   Eyes:      Extraocular Movements: Extraocular movements intact.      Conjunctiva/sclera: Conjunctivae normal.   Cardiovascular:      Rate and Rhythm: Normal rate and regular rhythm.      Heart sounds: Normal heart sounds. No murmur heard.     No friction rub. No gallop.   Pulmonary:      Effort: Pulmonary effort is normal.      Breath sounds: Normal breath sounds. No wheezing or rales.   Abdominal:      General: Bowel sounds are normal. There is no distension.      Palpations: Abdomen is soft.      Tenderness: There is no abdominal tenderness. There is no guarding.   Musculoskeletal:         General: No swelling or tenderness.      Right lower leg: No edema.      Left lower leg: No edema.      Comments: L hip dressing c/d/i   Skin:     General: Skin is warm and dry.      Capillary Refill: Capillary refill takes less than 2 seconds.   Neurological:      General: No focal deficit present.      Mental Status: He is alert and oriented to person, place, and time.   Psychiatric:         Mood and Affect: Mood normal.         Outpatient Follow-Up  Future Appointments   Date Time Provider Department Center   10/6/2024 11:30 AM Evaristo Schaeffer, PT [facility]   10/7/2024 To Be Determined Viky Dan OT [facility]   10/18/2024  9:45 AM Clifton Alicea MD [id number] [facility]         Jean Elaine, DO FACP "

## 2024-10-06 ENCOUNTER — HOME CARE VISIT (OUTPATIENT)
Dept: HOME HEALTH SERVICES | Facility: HOME HEALTH | Age: 74
End: 2024-10-06
Payer: MEDICARE

## 2024-10-06 VITALS
RESPIRATION RATE: 16 BRPM | SYSTOLIC BLOOD PRESSURE: 140 MMHG | HEART RATE: 64 BPM | DIASTOLIC BLOOD PRESSURE: 90 MMHG | TEMPERATURE: 98 F

## 2024-10-06 PROCEDURE — 169592 NO-PAY CLAIM PROCEDURE

## 2024-10-06 PROCEDURE — G0151 HHCP-SERV OF PT,EA 15 MIN: HCPCS | Mod: HHH

## 2024-10-06 PROCEDURE — 1090000001 HH PPS REVENUE CREDIT

## 2024-10-06 PROCEDURE — 1090000002 HH PPS REVENUE DEBIT

## 2024-10-06 PROCEDURE — 0023 HH SOC

## 2024-10-06 ASSESSMENT — ENCOUNTER SYMPTOMS
SUBJECTIVE PAIN PROGRESSION: UNCHANGED
PAIN LOCATION: LEFT HIP
PERSON REPORTING PAIN: PATIENT
PAIN LOCATION - PAIN SEVERITY: 5/10
PAIN: 1
HIGHEST PAIN SEVERITY IN PAST 24 HOURS: 6/10
LOWEST PAIN SEVERITY IN PAST 24 HOURS: 4/10
PAIN SEVERITY GOAL: 0/10

## 2024-10-06 ASSESSMENT — BALANCE ASSESSMENTS
SITTING BALANCE: 1 - STEADY, SAFE
ARISES: 1 - ABLE, USES ARMS TO HELP
BALANCE SCORE: 9
ATTEMPTS TO ARISE: 1 - ABLE, REQUIRES MORE THAN ONE ATTEMPT
NUDGED: 2 - STEADY
STANDING BALANCE: 1 - STEADY BUT WIDE STANCE AND USES CANE OR OTHER SUPPORT
ARISING SCORE: 1
TURNING 360 DEGREES STEPS: 0 - DISCONTINUOUS STEPS
EYES CLOSED AT MAXIMUM POSITION NUDGED: 1 - STEADY
NUDGED SCORE: 2
IMMEDIATE STANDING BALANCE FIRST 5 SECONDS: 1 - STEADY BUT USES WALKER OR OTHER SUPPORT
SITTING DOWN: 1 - USES ARMS OR NOT SMOOTH MOTION

## 2024-10-06 ASSESSMENT — ACTIVITIES OF DAILY LIVING (ADL)
AMBULATION ASSISTANCE ON FLAT SURFACES: 1
ENTERING_EXITING_HOME: STAND BY ASSIST
OASIS_M1830: 05

## 2024-10-06 ASSESSMENT — GAIT ASSESSMENTS
PATH SCORE: 1
BALANCE AND GAIT SCORE: 15
PATH: 1 - MILD/MODERATE DEVIATION OR USES WALKING AID
TRUNK SCORE: 0
TRUNK: 0 - MARKED SWAY OR USES WALKING AID
INITIATION OF GAIT IMMEDIATELY AFTER GO: 0 - ANY HESITANCY OR MULTIPLE ATTEMPTS TO START
STEP SYMMETRY: 0 - RIGHT AND LEFT STEP LENGTH NOT EQUAL
GAIT SCORE: 6
WALKING STANCE: 1 - HEELS ALMOST TOUCHING WHILE WALKING
STEP CONTINUITY: 0 - STOPPING OR DISCONTINUITY BETWEEN STEPS

## 2024-10-06 NOTE — HOME HEALTH
S: This pt was referred to home health PT following L hip fx with ORIF. He is demonstrating increased difficulty with mobility as a result.    B: Pt lives with his wife in a multistory house with 4 stairs to exit the building. His bedroom is on the second floor with 14 stairs to navigate. Pt is using a wheeled walker walker with SBA for all ambulation but was independent with ambulation without use of assistive device prior to current episode of care. Pt reports no recent falls and rates current pain level at 5/10. Medications reconciled in the home     A: Pt presents with L LE weakness affecting bed mobility, transfers, gait / stairs and balance, as well as gait and balance impairment as illustrated by Tinetti score of 15/28. Pt ambulates using wheeled walker with SBA demonstrating shortened R stride length, decreased L hip and knee flexion during swing through phase, decreased L stance phase time, instability and inconsistent rhythm. Pt educated in signs and symptoms of DVT and infection and instructed to seek immediate medical attention should any arise. Reviewed HEP issued from hospital and instructed in proper technique and frequency. Instructed pt to ambulate every 1-2 hours. Dressing intact with no seepage or signs of infection noted.    R: Pt will benefit from skilled PT for LE strengthening to facilitate bed mobility, transfers, gait / stairs and balance, as well as transfer, gait and balance training to improve functional mobility and independence. OT to follow for ADL training.

## 2024-10-06 NOTE — Clinical Note
Start of care completed for home health PT today. Pt presents with L LE weakness and balance deficits affecting mobility. We will see him twice weekly for 4 weeks or until he is ready for outpatient PT. OT to follow for ADL training.

## 2024-10-07 ENCOUNTER — HOME CARE VISIT (OUTPATIENT)
Dept: HOME HEALTH SERVICES | Facility: HOME HEALTH | Age: 74
End: 2024-10-07
Payer: MEDICARE

## 2024-10-07 PROCEDURE — G0152 HHCP-SERV OF OT,EA 15 MIN: HCPCS | Mod: HHH

## 2024-10-07 PROCEDURE — 1090000002 HH PPS REVENUE DEBIT

## 2024-10-07 PROCEDURE — 1090000001 HH PPS REVENUE CREDIT

## 2024-10-07 ASSESSMENT — ENCOUNTER SYMPTOMS
HIGHEST PAIN SEVERITY IN PAST 24 HOURS: 1/10
PAIN LOCATION: LEFT HIP
PERSON REPORTING PAIN: PATIENT
SUBJECTIVE PAIN PROGRESSION: WAXING AND WANING
PAIN: 1

## 2024-10-07 ASSESSMENT — ACTIVITIES OF DAILY LIVING (ADL)
BATHING ASSESSED: 1
TOILETING: 1
TOILETING: INDEPENDENT
DRESSING_LB_CURRENT_FUNCTION: MINIMUM ASSIST
BATHING_CURRENT_FUNCTION: INDEPENDENT

## 2024-10-08 PROCEDURE — 1090000001 HH PPS REVENUE CREDIT

## 2024-10-08 PROCEDURE — 1090000002 HH PPS REVENUE DEBIT

## 2024-10-09 ENCOUNTER — HOME CARE VISIT (OUTPATIENT)
Dept: HOME HEALTH SERVICES | Facility: HOME HEALTH | Age: 74
End: 2024-10-09
Payer: MEDICARE

## 2024-10-09 VITALS
DIASTOLIC BLOOD PRESSURE: 88 MMHG | TEMPERATURE: 97.7 F | SYSTOLIC BLOOD PRESSURE: 138 MMHG | OXYGEN SATURATION: 98 % | RESPIRATION RATE: 16 BRPM | HEART RATE: 76 BPM

## 2024-10-09 PROCEDURE — G0157 HHC PT ASSISTANT EA 15: HCPCS | Mod: CQ,HHH

## 2024-10-09 PROCEDURE — 1090000002 HH PPS REVENUE DEBIT

## 2024-10-09 PROCEDURE — 1090000001 HH PPS REVENUE CREDIT

## 2024-10-09 SDOH — HEALTH STABILITY: PHYSICAL HEALTH: EXERCISE TYPE: HEP

## 2024-10-09 ASSESSMENT — ENCOUNTER SYMPTOMS
PAIN LOCATION - PAIN SEVERITY: 1/10
LOWEST PAIN SEVERITY IN PAST 24 HOURS: 0/10
PAIN LOCATION - RELIEVING FACTORS: TYLENOL, REST
HIGHEST PAIN SEVERITY IN PAST 24 HOURS: 2/10
PERSON REPORTING PAIN: PATIENT
PAIN LOCATION - PAIN DURATION: DAILY
LIMITED RANGE OF MOTION: 1
PAIN LOCATION - PAIN FREQUENCY: INTERMITTENT
PAIN LOCATION: LEFT HIP
SUBJECTIVE PAIN PROGRESSION: GRADUALLY IMPROVING
PAIN: 1
PAIN LOCATION - PAIN QUALITY: ACHE

## 2024-10-09 ASSESSMENT — ACTIVITIES OF DAILY LIVING (ADL)
AMBULATION ASSISTANCE: ONE PERSON
AMBULATION ASSISTANCE ON FLAT SURFACES: 1

## 2024-10-10 PROCEDURE — 1090000001 HH PPS REVENUE CREDIT

## 2024-10-10 PROCEDURE — 1090000002 HH PPS REVENUE DEBIT

## 2024-10-11 ENCOUNTER — TELEPHONE (OUTPATIENT)
Dept: ORTHOPEDIC SURGERY | Facility: CLINIC | Age: 74
End: 2024-10-11
Payer: MEDICARE

## 2024-10-11 DIAGNOSIS — S72.115A CLOSED NONDISPLACED FRACTURE OF GREATER TROCHANTER OF LEFT FEMUR, INITIAL ENCOUNTER: ICD-10-CM

## 2024-10-11 PROCEDURE — 1090000002 HH PPS REVENUE DEBIT

## 2024-10-11 PROCEDURE — 1090000001 HH PPS REVENUE CREDIT

## 2024-10-11 NOTE — TELEPHONE ENCOUNTER
Patient called asking for his outpatient PT referral, he is almost done with Home care therapy and he has 4 PT appts already tentatively scheduled, they just need the referral entered.    Thanks

## 2024-10-12 PROCEDURE — 1090000002 HH PPS REVENUE DEBIT

## 2024-10-12 PROCEDURE — 1090000001 HH PPS REVENUE CREDIT

## 2024-10-14 ENCOUNTER — HOME CARE VISIT (OUTPATIENT)
Dept: HOME HEALTH SERVICES | Facility: HOME HEALTH | Age: 74
End: 2024-10-14
Payer: MEDICARE

## 2024-10-14 VITALS
SYSTOLIC BLOOD PRESSURE: 150 MMHG | RESPIRATION RATE: 16 BRPM | HEART RATE: 67 BPM | OXYGEN SATURATION: 97 % | DIASTOLIC BLOOD PRESSURE: 88 MMHG

## 2024-10-14 PROCEDURE — G0157 HHC PT ASSISTANT EA 15: HCPCS | Mod: CQ,HHH

## 2024-10-14 ASSESSMENT — ENCOUNTER SYMPTOMS
HIGHEST PAIN SEVERITY IN PAST 24 HOURS: 3/10
PAIN: 1
PERSON REPORTING PAIN: PATIENT
PAIN LOCATION: LEFT HIP
SUBJECTIVE PAIN PROGRESSION: WAXING AND WANING
MUSCLE WEAKNESS: 1
LOWEST PAIN SEVERITY IN PAST 24 HOURS: 1/10
ARTHRALGIAS: 1

## 2024-10-16 ENCOUNTER — HOME CARE VISIT (OUTPATIENT)
Dept: HOME HEALTH SERVICES | Facility: HOME HEALTH | Age: 74
End: 2024-10-16
Payer: MEDICARE

## 2024-10-16 VITALS
HEART RATE: 98 BPM | SYSTOLIC BLOOD PRESSURE: 128 MMHG | OXYGEN SATURATION: 98 % | TEMPERATURE: 97.8 F | RESPIRATION RATE: 16 BRPM | DIASTOLIC BLOOD PRESSURE: 76 MMHG

## 2024-10-16 PROCEDURE — G0157 HHC PT ASSISTANT EA 15: HCPCS | Mod: CQ,HHH

## 2024-10-16 ASSESSMENT — BALANCE ASSESSMENTS
NUDGED SCORE: 2
ATTEMPTS TO ARISE: 2 - ABLE TO RISE, ONE ATTEMPT
SITTING BALANCE: 1 - STEADY, SAFE
ARISES: 2 - ABLE WITHOUT USING ARMS
ARISING SCORE: 2
EYES CLOSED AT MAXIMUM POSITION NUDGED: 1 - STEADY
SITTING DOWN: 2 - SAFE, SMOOTH MOTION
IMMEDIATE STANDING BALANCE FIRST 5 SECONDS: 2 - STEADY WITHOUT WALKER OR OTHER SUPPORT
TURNING 360 DEGREES STEPS: 1 - CONTINUOUS STEPS
NUDGED: 2 - STEADY
STANDING BALANCE: 2 - NARROW STANCE WITHOUT SUPPORT
BALANCE SCORE: 16

## 2024-10-16 ASSESSMENT — GAIT ASSESSMENTS
TRUNK: 2 - NO SWAY, NO FLEXION, NO USE OF ARMS, NO WALKING AID
WALKING STANCE: 1 - HEELS ALMOST TOUCHING WHILE WALKING
STEP SYMMETRY: 1 - RIGHT AND LEFT STEP LENGTH APPEAR EQUAL
PATH SCORE: 2
PATH: 2 - STRAIGHT WITHOUT WALKING AID
STEP CONTINUITY: 1 - STEPS APPEAR CONTINUOUS
GAIT SCORE: 12
TRUNK SCORE: 2
INITIATION OF GAIT IMMEDIATELY AFTER GO: 1 - NO HESITANCY
BALANCE AND GAIT SCORE: 28

## 2024-10-16 ASSESSMENT — ENCOUNTER SYMPTOMS
PAIN LOCATION: LEFT HIP
PAIN LOCATION - PAIN FREQUENCY: INTERMITTENT
PAIN LOCATION - PAIN SEVERITY: 1/10
MUSCLE WEAKNESS: 1
PAIN LOCATION - PAIN DURATION: DAILY
PAIN: 1
HIGHEST PAIN SEVERITY IN PAST 24 HOURS: 1/10
PAIN LOCATION - PAIN QUALITY: ACHE
PERSON REPORTING PAIN: PATIENT

## 2024-10-16 ASSESSMENT — ACTIVITIES OF DAILY LIVING (ADL)
AMBULATION_DISTANCE/DURATION_TOLERATED: 70 FT
AMBULATION ASSISTANCE ON FLAT SURFACES: 1

## 2024-10-18 ENCOUNTER — HOSPITAL ENCOUNTER (OUTPATIENT)
Dept: RADIOLOGY | Facility: CLINIC | Age: 74
Discharge: HOME | End: 2024-10-18
Payer: MEDICARE

## 2024-10-18 ENCOUNTER — OFFICE VISIT (OUTPATIENT)
Dept: ORTHOPEDIC SURGERY | Facility: CLINIC | Age: 74
End: 2024-10-18
Payer: MEDICARE

## 2024-10-18 DIAGNOSIS — S72.115A CLOSED NONDISPLACED FRACTURE OF GREATER TROCHANTER OF LEFT FEMUR, INITIAL ENCOUNTER: ICD-10-CM

## 2024-10-18 PROCEDURE — 99211 OFF/OP EST MAY X REQ PHY/QHP: CPT | Performed by: ORTHOPAEDIC SURGERY

## 2024-10-18 PROCEDURE — 1159F MED LIST DOCD IN RCRD: CPT | Performed by: ORTHOPAEDIC SURGERY

## 2024-10-18 PROCEDURE — 1036F TOBACCO NON-USER: CPT | Performed by: ORTHOPAEDIC SURGERY

## 2024-10-18 PROCEDURE — 73502 X-RAY EXAM HIP UNI 2-3 VIEWS: CPT | Mod: LT

## 2024-10-18 PROCEDURE — 1125F AMNT PAIN NOTED PAIN PRSNT: CPT | Performed by: ORTHOPAEDIC SURGERY

## 2024-10-18 PROCEDURE — 1111F DSCHRG MED/CURRENT MED MERGE: CPT | Performed by: ORTHOPAEDIC SURGERY

## 2024-10-18 ASSESSMENT — PAIN - FUNCTIONAL ASSESSMENT: PAIN_FUNCTIONAL_ASSESSMENT: 0-10

## 2024-10-18 ASSESSMENT — PAIN DESCRIPTION - DESCRIPTORS: DESCRIPTORS: ACHING

## 2024-10-18 ASSESSMENT — PAIN SCALES - GENERAL: PAINLEVEL_OUTOF10: 4

## 2024-10-18 NOTE — PROGRESS NOTES
S: Pain well controlled.  Using a cane at home.  Occasional achiness over the lateral hip.  No groin pain.    O: no pain with range of motion left hip.  Good strength with flexion.    XR: I personally reviewed the following radiographic exams: AP pelvis and left hip shows stable gamma nail with nondisplaced healing intertrochanteric fracture    A: S/P ORIF left hip    P: Really having minimal symptoms.  Wants to travel for work which is reasonable.  Recommend baby aspirin while on the plane.  Can do low impact exercise as tolerated.  Plan to follow-up in a month if he is having any problems.

## 2024-10-21 ENCOUNTER — HOME CARE VISIT (OUTPATIENT)
Dept: HOME HEALTH SERVICES | Facility: HOME HEALTH | Age: 74
End: 2024-10-21
Payer: MEDICARE

## 2024-10-21 ASSESSMENT — ACTIVITIES OF DAILY LIVING (ADL)
HOME_HEALTH_OASIS: 00
OASIS_M1830: 00

## 2024-10-21 NOTE — PROGRESS NOTES
Physical Therapy  Physical Therapy Orthopedic Evaluation    Patient Name: James Viera  MRN: [id number]  Today's Date: 10/22/2024  Time Calculation  Start Time: 1145  Stop Time: 1230  Time Calculation (min): 45 min    Insurance:  Visit number: 1 of [place]  Authorization info: no auth needed  Insurance Type: Medicare  Cert date start: 10/22/24        Cert date end: 1/20/25                General:  Reason for visit: Left hip pain s/p fracture with ORIF and trochanteric nail 10/3/24  Referred by: Dr. Alicea     Current Problem  1. Left hip pain  Follow Up In Physical Therapy      2. Closed nondisplaced fracture of greater trochanter of left femur, initial encounter  Referral to Physical Therapy          Precautions  STEADI Fall Risk Score (The score of 4 or more indicates an increased risk of falling): 5  Precautions Comment: none    Medical History Form: Reviewed (scanned into chart)    Subjective:     Chief Complaint: Patient presents to clinic with left hip pain s/p fracture with ORIF and trochanteric nail.  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    Red Flags: Do you have any of the following? No  Fever/chills, unexplained weight changes,  dizziness/fainting, unexplained change in bowel or bladder functions, unexplained malaise or muscle weakness, night pain/sweats, numbness or tingling    Objective:    Gait: slight decreased hip ext LLE      Balance: SLS-     R: 30 sec.           L: 30 sec.    Palpation: slight TTP around incision    Flexibility: mod tight B HS, quad, HF, gastroc    Orthotics: no      ROM    Lumbar AROM (%)    Flexion: WFL  Extension: 50%  (L) Side Bend: WFL  (R) Side Bend: WFL  (L) Rotation: WFL  (R) Rotation: WFL      Hip AROM (Degrees): R=L      Strength Testing    LE strength MMT  R L  Hip flexion  5/5 4/5  Hip extension  4/5 4/5  Hip abduction  5/5 4-/5  Hip adduction  4/5 4/5  Hip IR   5/5 3+/5  Hip ER   5/5 3+/5  Knee extension 5/5 4/5  Knee flexion  4/5 4-/5          Functional Screening  Sit to stand : able to complete without UE support      Outcome Measures:  Other Measures  Lower Extremity Funtional Score (LEFS): 38/80     EDUCATION: home exercise program, plan of care, activity modifications, pain management, and injury pathology       Goals: Set and discussed today  Active       PT Problem       PT Goals       Start:  10/22/24    Expected End:  01/20/25       STG:  Patient will decrease pain to 0-2/10 in 4 weeks.   Patient will increase strength by >/= 1/2 mm grade in 4 weeks.  LEFS: +9 in 4 weeks.    LTG:  Patient will be able to walk >30 mins without increased pain in 8 weeks.  Patient will be able to ascend/descend stairs with step through pattern in 8 weeks.   Patient will increase LE flexibility to min tight in 8 weeks.                Plan of care was developed with input and agreement by the patient      Treatment Performed:  Access Code: [email or web address]    Exercises  - Supine Bridge  - 2 x daily - 7 x weekly - 2 sets - 10 reps - 5 sec hold  - Supine Active Straight Leg Raise (Mirrored)  - 2 x daily - 7 x weekly - 2 sets - 10 reps - 5 sec hold  - Sidelying Hip Abduction (Mirrored)  - 2 x daily - 7 x weekly - 2 sets - 10  reps  - Modified Rocky Stretch (Mirrored)  - 2 x daily - 7 x weekly - 1 sets - 2 reps - 30-60 sec hold  - Supine Hamstring Stretch (Mirrored)  - 2 x daily - 7 x weekly - 1 sets - 2 reps - 30-60 sec. hold      Charges: Low complexity eval, TE, self care  Clinical Presentation: Stable  Prognosis/Rehab Potential: Good    Assessment: Patient presents with signs and symptoms consistent with left hip pain s/p fracture with ORIF and trochanteric nail 10/3/24, resulting in limited participation in pain-free ADLs and inability to perform at their prior level of function. Pt would benefit from physical therapy to address the impairments found & listed previously in the objective section in order to return to safe and pain-free ADLs and prior level of function.       Plan:     Planned Interventions include: therapeutic exercise, self-care home management, manual therapy, therapeutic activities, gait training, neuromuscular coordination, vasopneumatic, dry needling, aquatic therapy  Frequency: 1-2 x Week  Duration: 8 Weeks    Bibi Cheek, PT

## 2024-10-22 ENCOUNTER — EVALUATION (OUTPATIENT)
Dept: PHYSICAL THERAPY | Facility: CLINIC | Age: 74
End: 2024-10-22
Payer: MEDICARE

## 2024-10-22 DIAGNOSIS — S72.115A CLOSED NONDISPLACED FRACTURE OF GREATER TROCHANTER OF LEFT FEMUR, INITIAL ENCOUNTER: ICD-10-CM

## 2024-10-22 DIAGNOSIS — M25.552 LEFT HIP PAIN: Primary | ICD-10-CM

## 2024-10-22 PROCEDURE — 97161 PT EVAL LOW COMPLEX 20 MIN: CPT | Mod: GP,KX

## 2024-10-22 PROCEDURE — 97535 SELF CARE MNGMENT TRAINING: CPT | Mod: GP,KX

## 2024-10-22 PROCEDURE — 97110 THERAPEUTIC EXERCISES: CPT | Mod: GP,KX

## 2024-10-22 ASSESSMENT — ENCOUNTER SYMPTOMS
OCCASIONAL FEELINGS OF UNSTEADINESS: 1
LOSS OF SENSATION IN FEET: 0
DEPRESSION: 0

## 2024-10-22 ASSESSMENT — PAIN - FUNCTIONAL ASSESSMENT: PAIN_FUNCTIONAL_ASSESSMENT: 0-10

## 2024-10-22 ASSESSMENT — PAIN SCALES - GENERAL: PAINLEVEL_OUTOF10: 2

## 2024-10-23 DIAGNOSIS — M25.552 LEFT HIP PAIN: Primary | ICD-10-CM

## 2024-10-23 NOTE — PROGRESS NOTES
"Physical Therapy  Physical Therapy Treatment    Patient Name: James Viera  MRN: [id number]  Today's Date: 10/24/2024  Time Calculation  Start Time: 1500  Stop Time: 1545  Time Calculation (min): 45 min    Insurance:  Visit number: 2 of [place]  Authorization info: no auth needed  Insurance Type: Medicare  Cert date start: 10/22/24        Cert date end: 1/20/25                 General:  Reason for visit: Left hip pain s/p fracture with ORIF and trochanteric nail 10/3/24  Referred by: Dr. Alicea        Current Problem  1. Left hip pain            Precautions  Precautions Comment: none    Pain Assessment: 0-10  0-10 (Numeric) Pain Score: 1    Subjective:   Patient reports that he has been doing fine with exercises.   HEP Performed:  Yes    Objective:     Gait: decreased hip ext LLE    Treatments:   R. Stepper, seat 4, L3.5, 5'  DBE 2'x2 directions  Slow airex march 2'  Fwd bosu lunges x 15 B  6\" step up and hold 5\" x 10 B  Bravo side steps 7.5#, 10x B  Stair HS stretch 1' x 2 B  Bridge 5\" hold x 15  Bridge with march x 10  Supine butterfly stretch 1'  Supine piriformis stretch 1' B    Access Code: [email or web address]     Charges: Tex2, NMx1    Assessment: Patient challenged by lunge motion but did not have pain with a static HF stretch.       Plan: Continue to increase LE strength, flexibility, balance and WB endurance for return to PLOF.    Bibi Cheek, PT  "

## 2024-10-24 ENCOUNTER — TREATMENT (OUTPATIENT)
Dept: PHYSICAL THERAPY | Facility: CLINIC | Age: 74
End: 2024-10-24
Payer: MEDICARE

## 2024-10-24 DIAGNOSIS — M25.552 LEFT HIP PAIN: Primary | ICD-10-CM

## 2024-10-24 PROCEDURE — 97110 THERAPEUTIC EXERCISES: CPT | Mod: GP,KX

## 2024-10-24 PROCEDURE — 97112 NEUROMUSCULAR REEDUCATION: CPT | Mod: GP,KX

## 2024-10-24 ASSESSMENT — PAIN SCALES - GENERAL: PAINLEVEL_OUTOF10: 1

## 2024-10-24 ASSESSMENT — PAIN - FUNCTIONAL ASSESSMENT: PAIN_FUNCTIONAL_ASSESSMENT: 0-10

## 2024-10-29 ENCOUNTER — TREATMENT (OUTPATIENT)
Dept: PHYSICAL THERAPY | Facility: CLINIC | Age: 74
End: 2024-10-29
Payer: MEDICARE

## 2024-10-29 DIAGNOSIS — M25.552 LEFT HIP PAIN: Primary | ICD-10-CM

## 2024-10-29 PROCEDURE — 97110 THERAPEUTIC EXERCISES: CPT | Mod: GP,KX

## 2024-10-29 PROCEDURE — 97112 NEUROMUSCULAR REEDUCATION: CPT | Mod: GP,KX

## 2024-10-29 ASSESSMENT — PAIN - FUNCTIONAL ASSESSMENT: PAIN_FUNCTIONAL_ASSESSMENT: 0-10

## 2024-10-29 ASSESSMENT — PAIN SCALES - GENERAL: PAINLEVEL_OUTOF10: 1

## 2024-11-01 ENCOUNTER — TREATMENT (OUTPATIENT)
Dept: PHYSICAL THERAPY | Facility: CLINIC | Age: 74
End: 2024-11-01
Payer: MEDICARE

## 2024-11-01 DIAGNOSIS — M25.552 LEFT HIP PAIN: Primary | ICD-10-CM

## 2024-11-01 PROCEDURE — 97110 THERAPEUTIC EXERCISES: CPT | Mod: GP,KX

## 2024-11-01 PROCEDURE — 97112 NEUROMUSCULAR REEDUCATION: CPT | Mod: GP,KX

## 2024-11-01 ASSESSMENT — PAIN - FUNCTIONAL ASSESSMENT: PAIN_FUNCTIONAL_ASSESSMENT: 0-10

## 2024-11-01 ASSESSMENT — PAIN SCALES - GENERAL: PAINLEVEL_OUTOF10: 1

## 2024-11-05 ENCOUNTER — APPOINTMENT (OUTPATIENT)
Dept: PHYSICAL THERAPY | Facility: CLINIC | Age: 74
End: 2024-11-05
Payer: MEDICARE

## 2024-11-05 DIAGNOSIS — M25.552 LEFT HIP PAIN: Primary | ICD-10-CM

## 2024-11-07 ENCOUNTER — APPOINTMENT (OUTPATIENT)
Dept: PHYSICAL THERAPY | Facility: CLINIC | Age: 74
End: 2024-11-07
Payer: MEDICARE

## 2024-11-07 DIAGNOSIS — M25.552 LEFT HIP PAIN: Primary | ICD-10-CM

## 2024-11-11 NOTE — PROGRESS NOTES
"Physical Therapy  Physical Therapy Treatment    Patient Name: James Viera  MRN: [id number]  Today's Date: 11/12/2024  Time Calculation  Start Time: 1500  Stop Time: 1545  Time Calculation (min): 45 min    Insurance:  Visit number: 5 of [place]  Authorization info: no auth needed  Insurance Type: Medicare  Cert date start: 10/22/24        Cert date end: 1/20/25                 General:  Reason for visit: Left hip pain s/p fracture with ORIF and trochanteric nail 10/3/24  Referred by: Dr. Alicea        Current Problem  1. Left hip pain            Precautions  Precautions Comment: none    Pain Assessment: 0-10  0-10 (Numeric) Pain Score: 1    Subjective:   Patient reports he did great on his trip to [facility] with lots of walking.    HEP Performed:  Yes    Objective:     Gait: normal gait pattern    Treatments:   R. Stepper, seat 4, L5, 5'  Biodex LOS L12: 21%, 31%  SLS 4 kg KB swing cw/ccw x 15 B  Tandem 1/2 foam 4# plyotoss x 25 B  Leg press 100#, 15x2  Bravo side steps 10#, x 10 B  Squats with 4 Kg KB 10x2  Bosu step up and hold 5\", 15x2 B  Standing HS stretch 1' B    Access Code: [email or web address]     Charges: Tex2, NMx1    Assessment: No pain with lunging today. LOB with plyotoss when his feet got twisted but was able to jump right back up with no discomfort.      Plan: Continue to increase LE strength, flexibility, balance and WB endurance for return to OF.    Bibi Cheek, PT  " No

## 2024-11-12 ENCOUNTER — TREATMENT (OUTPATIENT)
Dept: PHYSICAL THERAPY | Facility: CLINIC | Age: 74
End: 2024-11-12
Payer: MEDICARE

## 2024-11-12 DIAGNOSIS — M25.552 LEFT HIP PAIN: Primary | ICD-10-CM

## 2024-11-12 PROCEDURE — 97110 THERAPEUTIC EXERCISES: CPT | Mod: GP,KX

## 2024-11-12 PROCEDURE — 97112 NEUROMUSCULAR REEDUCATION: CPT | Mod: GP,KX

## 2024-11-12 ASSESSMENT — PAIN - FUNCTIONAL ASSESSMENT: PAIN_FUNCTIONAL_ASSESSMENT: 0-10

## 2024-11-12 ASSESSMENT — PAIN SCALES - GENERAL: PAINLEVEL_OUTOF10: 1

## 2024-11-13 NOTE — PROGRESS NOTES
"Physical Therapy  Physical Therapy Treatment    Patient Name: James Viera  MRN: [id number]  Today's Date: 11/14/2024  Time Calculation  Start Time: 1145  Stop Time: 1225  Time Calculation (min): 40 min    Insurance:  Visit number: 6 of [place]  Authorization info: no auth needed  Insurance Type: Medicare  Cert date start: 10/22/24        Cert date end: 1/20/25                 General:  Reason for visit: Left hip pain s/p fracture with ORIF and trochanteric nail 10/3/24  Referred by: Dr. Alicea        Current Problem  1. Left hip pain            Precautions  Precautions Comment: none    Pain Assessment: 0-10  0-10 (Numeric) Pain Score: 1    Subjective:   Patient complains of same mild soreness on lateral hip that he has felt during his PT POC. Plans to start working with  again.     HEP Performed:  Yes    Objective:     Gait: normal gait pattern    Treatments:   R. Stepper, seat 4, L5, 5'  Biodex LOS L12: 28%, 31%  Squats with 4 Kg KB 15x2  Lunge walk 30 ft x 2   Bravo side steps 12.5#, x 10 B  SLS 4 kg KB swing cw/ccw x 15 B  Cybex hip abd/add 40#, 15x2 each  Leg press 105#, 20x2  Bosu step up and hold 5\", 15x2 B  Standing HS stretch 1' B  Supine HF stretch 1' B    Access Code: [email or web address]     Charges: Tex2, NMx1    Assessment: Patient with improved dynamic balance today.       Plan: Continue to increase LE strength, flexibility, balance and WB endurance for return to PLOF.    Bibi Cheek, PT  "

## 2024-11-14 ENCOUNTER — TREATMENT (OUTPATIENT)
Dept: PHYSICAL THERAPY | Facility: CLINIC | Age: 74
End: 2024-11-14
Payer: MEDICARE

## 2024-11-14 DIAGNOSIS — M25.552 LEFT HIP PAIN: Primary | ICD-10-CM

## 2024-11-14 PROCEDURE — 97110 THERAPEUTIC EXERCISES: CPT | Mod: GP,KX

## 2024-11-14 PROCEDURE — 97112 NEUROMUSCULAR REEDUCATION: CPT | Mod: GP,KX

## 2024-11-14 ASSESSMENT — PAIN - FUNCTIONAL ASSESSMENT: PAIN_FUNCTIONAL_ASSESSMENT: 0-10

## 2024-11-14 ASSESSMENT — PAIN SCALES - GENERAL: PAINLEVEL_OUTOF10: 1

## 2024-11-15 ENCOUNTER — HOSPITAL ENCOUNTER (OUTPATIENT)
Dept: RADIOLOGY | Facility: CLINIC | Age: 74
Discharge: HOME | End: 2024-11-15
Payer: MEDICARE

## 2024-11-15 ENCOUNTER — OFFICE VISIT (OUTPATIENT)
Dept: ORTHOPEDIC SURGERY | Facility: CLINIC | Age: 74
End: 2024-11-15
Payer: MEDICARE

## 2024-11-15 DIAGNOSIS — S72.115A CLOSED NONDISPLACED FRACTURE OF GREATER TROCHANTER OF LEFT FEMUR, INITIAL ENCOUNTER: ICD-10-CM

## 2024-11-15 PROCEDURE — 73502 X-RAY EXAM HIP UNI 2-3 VIEWS: CPT | Mod: LT

## 2024-11-15 PROCEDURE — 1159F MED LIST DOCD IN RCRD: CPT | Performed by: ORTHOPAEDIC SURGERY

## 2024-11-15 PROCEDURE — 99211 OFF/OP EST MAY X REQ PHY/QHP: CPT | Performed by: ORTHOPAEDIC SURGERY

## 2024-11-15 PROCEDURE — 1036F TOBACCO NON-USER: CPT | Performed by: ORTHOPAEDIC SURGERY

## 2024-11-15 NOTE — PROGRESS NOTES
Returns to discuss left hip.  Having minimal symptoms.  6 weeks after surgical repair.    Exam: Full range of motion.    I personally reviewed the following radiographic exams: AP pelvis and left hip shows healed inotrope fracture with stable gamma nail.  No prolapse.    Assessment: Status post ORIF left hip intertrochanteric fracture.    Plan: No restrictions.  Can return to his exercise activity with his .  Will follow-up as needed.

## 2024-11-15 NOTE — PROGRESS NOTES
"Physical Therapy  Physical Therapy Treatment    Patient Name: James Viera  MRN: [id number]  Today's Date: 11/18/2024  Time Calculation  Start Time: 0945  Stop Time: 1030  Time Calculation (min): 45 min    Insurance:  Visit number: 7 of [place]  Authorization info: no auth needed  Insurance Type: Medicare  Cert date start: 10/22/24        Cert date end: 1/20/25                 General:  Reason for visit: Left hip pain s/p fracture with ORIF and trochanteric nail 10/3/24  Referred by: Dr. Alicea        Current Problem  1. Left hip pain  Follow Up In Physical Therapy          Precautions  Precautions Comment: none    Pain Assessment: 0-10  0-10 (Numeric) Pain Score: 1    Subjective:   Patient reports that he saw MD who was happy with his recovery. Meeting with  claude.    HEP Performed:  Yes    Objective:     Gait: normal gait pattern    Treatments:   R. Stepper, seat 4, L5, 5'  Biodex LOS L12: 33%, 29%  Lunge walk 40 ft x 2   Bosu step up and hold 5\", 15x2 B   SLS on airex 4 kg KB swing cw/ccw x 15 B   Squats with 4 Kg KB 20 x2  Leg press 110#, 20x2   Bravo side steps 12.5#, x 10 B  Cybex hip abd/add 40#, 20x2 each  Supine butterfly stretch 1' B  Supine HF stretch 1' B    Access Code: [email or web address]     Charges: Tex2, NMx1    Assessment: Patients hip felt looser post-session.       Plan: Assess for discharge next visit.    Bibi Cheek, PT  "

## 2024-11-18 ENCOUNTER — TREATMENT (OUTPATIENT)
Dept: PHYSICAL THERAPY | Facility: CLINIC | Age: 74
End: 2024-11-18
Payer: MEDICARE

## 2024-11-18 DIAGNOSIS — M25.552 LEFT HIP PAIN: Primary | ICD-10-CM

## 2024-11-18 PROCEDURE — 97110 THERAPEUTIC EXERCISES: CPT | Mod: GP,KX

## 2024-11-18 PROCEDURE — 97112 NEUROMUSCULAR REEDUCATION: CPT | Mod: GP,KX

## 2024-11-18 ASSESSMENT — PAIN - FUNCTIONAL ASSESSMENT: PAIN_FUNCTIONAL_ASSESSMENT: 0-10

## 2024-11-18 ASSESSMENT — PAIN SCALES - GENERAL: PAINLEVEL_OUTOF10: 1

## 2024-11-20 NOTE — PROGRESS NOTES
Physical Therapy  Physical Therapy Orthopedic Progress Note and Discharge Note    Patient Name: James Viera  MRN: [id number]  Today's Date: 11/21/2024  Time Calculation  Start Time: 1330  Stop Time: 1355  Time Calculation (min): 25 min    Insurance:  Visit number: 8 of [place]  Authorization info: no auth needed  Insurance Type: Medicare  Cert date start: 10/22/24        Cert date end: 1/20/25                 General:  Reason for visit: Left hip pain s/p fracture with ORIF and trochanteric nail 10/3/24  Referred by: Dr. Alicea     Current Problem  1. Left hip pain  Follow Up In Physical Therapy          Precautions  Precautions Comment: none      Subjective:    Patient reports that he is going back to the . Is ready to graduate from PT today.     Current Condition:   Better    Pain:  Pain Assessment: 0-10  0-10 (Numeric) Pain Score: 1    Self Reported Function (0-100%) = 90%  (100% being back to PLOF)    Objective:    Gait: normal gait pattern                         Balance: SLS-     R: 30 sec.           L: 30 sec.     Palpation: denies TTP     Flexibility: min tight B HS, quad, HF, gastroc        ROM     Lumbar AROM (%)     Flexion: WFL  Extension: 50%  (L) Side Bend: WFL  (R) Side Bend: WFL  (L) Rotation: WFL  (R) Rotation: WFL        Hip AROM (Degrees): R=L                             Strength Testing     LE strength MMT          R         L  Hip flexion                   5/5       4/5  Hip extension              4+/5       4/5  Hip abduction              5/5       4/5  Hip adduction              4+/5      4/5  Hip IR                           5/5       4/5  Hip ER                          5/5       4/5  Knee extension            5/5        4+/5  Knee flexion                 4+/5      4+/5                                      Functional Screening  Sit to stand : able to complete without UE support      Outcome Measures: Updated 11/21/2024  Other Measures  Lower Extremity Funtional Score (LEFS): 57/80      EDUCATION: home exercise program       Goals: Updated 11/21/2024  Resolved       PT Problem       PT Goals (Met)       Start:  10/22/24    Expected End:  01/20/25    Resolved:  11/21/24    STG:  Patient will decrease pain to 0-2/10 in 4 weeks.   Patient will increase strength by >/= 1/2 mm grade in 4 weeks.  LEFS: +9 in 4 weeks.    LTG:  Patient will be able to walk >30 mins without increased pain in 8 weeks.  Patient will be able to ascend/descend stairs with step through pattern in 8 weeks.   Patient will increase LE flexibility to min tight in 8 weeks.                Plan of care was developed with input and agreement by the patient      Treatment Performed:  R. Stepper, seat 4, L5, 5'  Recheck completed  Reviewed HEP     Access Code: [email or web address]     Charges: Tex2    Assessment: Patient has demonstrated significant improvement in LE strength, flexibility, balance and WB endurance. Is back to all functional activities. Reviewed HEP and answered all questions.       Plan: Updated 11/21/2024    Patient has met goals and will be discharged to independent HEP.    Bibi Cheek, PT

## 2024-11-21 ENCOUNTER — TREATMENT (OUTPATIENT)
Dept: PHYSICAL THERAPY | Facility: CLINIC | Age: 74
End: 2024-11-21
Payer: MEDICARE

## 2024-11-21 DIAGNOSIS — M25.552 LEFT HIP PAIN: Primary | ICD-10-CM

## 2024-11-21 PROCEDURE — 97110 THERAPEUTIC EXERCISES: CPT | Mod: GP,KX

## 2024-11-21 ASSESSMENT — PAIN SCALES - GENERAL: PAINLEVEL_OUTOF10: 1

## 2024-11-21 ASSESSMENT — PAIN - FUNCTIONAL ASSESSMENT: PAIN_FUNCTIONAL_ASSESSMENT: 0-10

## (undated) DEVICE — SUTURE, VICRYL, 2-0, 36 IN, CT-1, UNDYED

## (undated) DEVICE — COVER, MAYO STAND, W/PAD, 23 IN, DISPOSABLE, PLASTIC, LF, STERILE

## (undated) DEVICE — SUTURE, MONOCRYL, 4-0, 27 IN, PS-2, UNDYED

## (undated) DEVICE — GLOVE, SURGICAL, PROTEXIS PI ORTHO, 8.0, PF, LF

## (undated) DEVICE — WIRE, KIRSCHNER 3.2 X 450

## (undated) DEVICE — Device

## (undated) DEVICE — DRILL BIT, 4.2 X 340MM

## (undated) DEVICE — DRESSING, MEPILEX BORDER, POST-OP AG, 4 X 6 IN

## (undated) DEVICE — SOLUTION, IRRIGATION, SODIUM CHLORIDE 0.9%, 1000 ML, POUR BOTTLE

## (undated) DEVICE — SUTURE, VICRYL 0, 36 IN, CT-1, VIOLET

## (undated) DEVICE — CLOSURE SYSTEM, DERMABOND, PRINEO, 22CM, STERILE

## (undated) DEVICE — SUTURE, MONOCRYL, 3-0, 18 IN, PS2, UNDYED

## (undated) DEVICE — DRILL BIT, 4.2 X 180MM

## (undated) DEVICE — DRESSING, MEPILEX BORDER, POST-OP AG, 4 X 8 IN

## (undated) DEVICE — KIT, HIP FRACTURES AND SCOPES, CUSTOM, AHUJA

## (undated) DEVICE — TOWEL, SURGICAL, NEURO, O/R, 16 X 26, BLUE, STERILE

## (undated) DEVICE — GLOVE, SURGICAL, PROTEXIS PI W/NEU-THERA, 8.0, PF, LF

## (undated) DEVICE — COVER, EQUIPMENT, C ARM, W/ STRAPS